# Patient Record
Sex: FEMALE | Race: OTHER
[De-identification: names, ages, dates, MRNs, and addresses within clinical notes are randomized per-mention and may not be internally consistent; named-entity substitution may affect disease eponyms.]

---

## 2019-09-20 ENCOUNTER — HOSPITAL ENCOUNTER (OUTPATIENT)
Dept: HOSPITAL 62 - RAD | Age: 29
End: 2019-09-20
Attending: PHYSICIAN ASSISTANT
Payer: OTHER GOVERNMENT
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
## 2019-09-20 DIAGNOSIS — S63.502D: Primary | ICD-10-CM

## 2019-09-20 PROCEDURE — A9576 INJ PROHANCE MULTIPACK: HCPCS

## 2019-09-20 PROCEDURE — 25246 INJECTION FOR WRIST X-RAY: CPT

## 2019-09-20 PROCEDURE — 77002 NEEDLE LOCALIZATION BY XRAY: CPT

## 2019-09-20 PROCEDURE — 73222 MRI JOINT UPR EXTREM W/DYE: CPT

## 2019-09-20 NOTE — RADIOLOGY REPORT (SQ)
EXAM DESCRIPTION:  MRI LT UPPER JOINT WITH



COMPLETED DATE/TIME:  9/20/2019 10:58 am



REASON FOR STUDY:  S63.502D UNSPECIFIED SPRAIN OF LEFT WRIST, SUBSEQUENT ENCOUNTER S63.502D  UNSPECIF
IED SPRAIN OF LEFT WRIST, SUBSEQUENT ENCOUN



COMPARISON:  None.



TECHNIQUE:  Left wrist post-arthrogram imaging includes T1 and T1 and T2 fat sat sequences.



LIMITATIONS:  None.



FINDINGS:  JOINT DISTENSION: Adequate.  No loose body.  There is extravasation of contrast dorsally a
long the injection pathway, with a small amount of injected contrast into the extensor tendon compart
ments on axial images 7-12.

BONE MARROW: No alteration of signal to suggest marrow replacement or edema. No occult fracture. No l
arge osteophytes.

CARPAL ALIGNMENT AND ARTICULATION: Normal congruity of sigmoid notch at level of distal ruj without p
ositive or negative ulnar variance. Normal capitolunate angle. No widening of scapholunate articulati
on.

SCAPHOLUNATE LIGAMENT: Although there is mild widening at the scapholunate interval on coronal image 
9, scapholunate ligaments are intact, there is no leakage of contrast from the radiocarpal joint into
 the intercarpal joints.

LUNATO-TRIQUETRAL LIGAMENT: Without tear. No contrast in middle carpal compartment.

TFC COMPLEX: Radial and ulnar attachments normal. Meniscus intact. Extensor carpi ulnaris tendon norm
al without tendinopathy. No contrast in distal RUJ.

EXTRINSIC LIGAMENTS AND DISTAL RADIO-ULNAR JOINT: Dorsal and volar distal RUJ ligaments intact withou
t subluxation of the distal ulna with respect to the radius.

1-6 EXTENSOR COMPARTMENTS: Normal. Specifically no tendinopathy of the abductor pollicis longus or ex
tensor pollicis brevis to suggest de Quervains syndrome.

CARPAL TUNNEL AND MEDIAN NERVE: Normal volume and morphology of carpal tunnel proximal at the level o
f the radiocarpal joint and distally at the hook of the hamate. No thickening or signal alteration of
 median nerve.

OTHER: No other significant finding.



IMPRESSION:  NORMAL MRI ARTHROGRAM OF THE WRIST.



TECHNICAL DOCUMENTATION:  JOB ID:  [id number]

 2011 [employer]- All Rights Reserved



Reading location - IP/workstation name: [facility]

## 2019-09-20 NOTE — RADIOLOGY REPORT (SQ)
EXAM DESCRIPTION:  FLUORO/NEEDLE PLACEMENT; ARTHRO WRIST INJECTION



COMPLETED DATE/TIME:  9/20/2019 10:28 am



REASON FOR STUDY:  S63.502D UNSPECIFIED SPRAIN OF LEFT WRIST, SUBSEQUENT ENCOUNTER S63.502D  UNSPECIF
IED SPRAIN OF LEFT WRIST, SUBSEQUENT ENCOUN



COMPARISON:  None.



FLUOROSCOPY TIME:  16 seconds

1 images saved to PACS.



LIMITATIONS:  None.



PROCEDURE:  Procedure, risks, benefits and alternatives explained to patient who then gave written co
nsent. The left wrist was marked and a time-out was called for correct marking verification.  Radioca
rpal site marked using fluoroscopic guidance.  Wrist prepped and draped using sterile technique.  Loc
al anesthesia achieved using 1% lidocaine injection.  Hypodermic needle introduced into the joint spa
ce under direct fluoroscopic visualization. Non-ionic contrast instilled to confirm intra-articular p
osition. Dilute gadolinium solution then injected.  Needle removed and entry site covered with steril
e bandage. No immediate complications noted.



TECHNIQUE:  Digital images acquired during fluoroscopy and stored on PACS.   Patient immediately take
n to the MR suite for additional imaging.

INJECTION LOCATION: Left wrist.

CONTRAST TYPE AND AMOUNT: Dotarem 3 mL



IMPRESSION:  SUCCESSFUL NEEDLE PLACEMENT AND INJECTION FOR LEFT WRIST MR ARTHROGRAM.



COMMENT:  Quality ID #145: Final reports for procedures using fluoroscopy that document radiation exp
osure indices, or exposure time and number of fluorographic images (if radiation exposure indices are
 not available)



TECHNICAL DOCUMENTATION:  JOB ID:  [id number]

 2011 [employer]- All Rights Reserved



Reading location - IP/workstation name: [email or web address]

## 2019-09-20 NOTE — RADIOLOGY REPORT (SQ)
EXAM DESCRIPTION:  FLUORO/NEEDLE PLACEMENT; ARTHRO WRIST INJECTION



COMPLETED DATE/TIME:  9/20/2019 10:28 am



REASON FOR STUDY:  S63.502D UNSPECIFIED SPRAIN OF LEFT WRIST, SUBSEQUENT ENCOUNTER S63.502D  UNSPECIF
IED SPRAIN OF LEFT WRIST, SUBSEQUENT ENCOUN



COMPARISON:  None.



FLUOROSCOPY TIME:  16 seconds

1 images saved to PACS.



LIMITATIONS:  None.



PROCEDURE:  Procedure, risks, benefits and alternatives explained to patient who then gave written co
nsent. The left wrist was marked and a time-out was called for correct marking verification.  Radioca
rpal site marked using fluoroscopic guidance.  Wrist prepped and draped using sterile technique.  Loc
al anesthesia achieved using 1% lidocaine injection.  Hypodermic needle introduced into the joint spa
ce under direct fluoroscopic visualization. Non-ionic contrast instilled to confirm intra-articular p
osition. Dilute gadolinium solution then injected.  Needle removed and entry site covered with steril
e bandage. No immediate complications noted.



TECHNIQUE:  Digital images acquired during fluoroscopy and stored on PACS.   Patient immediately take
n to the MR suite for additional imaging.

INJECTION LOCATION: Left wrist.

CONTRAST TYPE AND AMOUNT: Dotarem 3 mL



IMPRESSION:  SUCCESSFUL NEEDLE PLACEMENT AND INJECTION FOR LEFT WRIST MR ARTHROGRAM.



COMMENT:  Quality ID #145: Final reports for procedures using fluoroscopy that document radiation exp
osure indices, or exposure time and number of fluorographic images (if radiation exposure indices are
 not available)



TECHNICAL DOCUMENTATION:  JOB ID:  [id number]

 2011 [employer]- All Rights Reserved



Reading location - IP/workstation name: [email or web address]

## 2020-10-06 ENCOUNTER — HOSPITAL ENCOUNTER (EMERGENCY)
Dept: HOSPITAL 62 - ER | Age: 30
Discharge: LEFT BEFORE DISCHARGE | End: 2020-10-06
Payer: OTHER GOVERNMENT

## 2020-10-06 VITALS — SYSTOLIC BLOOD PRESSURE: 114 MMHG | DIASTOLIC BLOOD PRESSURE: 88 MMHG

## 2020-10-06 DIAGNOSIS — Z53.21: Primary | ICD-10-CM

## 2021-03-09 ENCOUNTER — APPOINTMENT (OUTPATIENT)
Dept: CT IMAGING | Facility: HOSPITAL | Age: 31
End: 2021-03-09

## 2021-03-09 ENCOUNTER — HOSPITAL ENCOUNTER (EMERGENCY)
Facility: HOSPITAL | Age: 31
Discharge: HOME | End: 2021-03-09
Attending: EMERGENCY MEDICINE | Admitting: EMERGENCY MEDICINE

## 2021-03-09 VITALS
HEART RATE: 71 BPM | RESPIRATION RATE: 16 BRPM | HEIGHT: 61 IN | BODY MASS INDEX: 27.38 KG/M2 | DIASTOLIC BLOOD PRESSURE: 65 MMHG | WEIGHT: 145 LBS | SYSTOLIC BLOOD PRESSURE: 108 MMHG | TEMPERATURE: 98.7 F | OXYGEN SATURATION: 97 %

## 2021-03-09 DIAGNOSIS — R11.0 NAUSEA: ICD-10-CM

## 2021-03-09 DIAGNOSIS — R10.31 RIGHT LOWER QUADRANT ABDOMINAL PAIN: Primary | ICD-10-CM

## 2021-03-09 LAB
ALBUMIN SERPL-MCNC: 4 G/DL (ref 3.5–5.2)
ALBUMIN/GLOB SERPL: 1.1 G/DL
ALP SERPL-CCNC: 97 U/L (ref 39–117)
ALT SERPL W P-5'-P-CCNC: 35 U/L (ref 1–33)
ANION GAP SERPL CALCULATED.3IONS-SCNC: 7.7 MMOL/L (ref 5–15)
AST SERPL-CCNC: 24 U/L (ref 1–32)
B-HCG UR QL: NEGATIVE
BACTERIA UR QL AUTO: ABNORMAL /HPF
BACTERIA UR QL AUTO: ABNORMAL /HPF
BASOPHILS # BLD AUTO: 0.05 10*3/MM3 (ref 0–0.2)
BASOPHILS NFR BLD AUTO: 0.5 % (ref 0–1.5)
BILIRUB SERPL-MCNC: 0.4 MG/DL (ref 0–1.2)
BILIRUB UR QL STRIP: NEGATIVE
BILIRUB UR QL STRIP: NEGATIVE
BUN SERPL-MCNC: 13 MG/DL (ref 6–20)
BUN/CREAT SERPL: 23.2 (ref 7–25)
CALCIUM SPEC-SCNC: 8.7 MG/DL (ref 8.6–10.5)
CHLORIDE SERPL-SCNC: 104 MMOL/L (ref 98–107)
CLARITY UR: CLEAR
CLARITY UR: CLEAR
CO2 SERPL-SCNC: 24.3 MMOL/L (ref 22–29)
COLOR UR: YELLOW
COLOR UR: YELLOW
CREAT SERPL-MCNC: 0.56 MG/DL (ref 0.57–1)
DEPRECATED RDW RBC AUTO: 42.3 FL (ref 37–54)
EOSINOPHIL # BLD AUTO: 0.12 10*3/MM3 (ref 0–0.4)
EOSINOPHIL NFR BLD AUTO: 1.2 % (ref 0.3–6.2)
ERYTHROCYTE [DISTWIDTH] IN BLOOD BY AUTOMATED COUNT: 12.3 % (ref 12.3–15.4)
GFR SERPL CREATININE-BSD FRML MDRD: 127 ML/MIN/1.73
GFR SERPL CREATININE-BSD FRML MDRD: >150 ML/MIN/1.73
GLOBULIN UR ELPH-MCNC: 3.5 GM/DL
GLUCOSE SERPL-MCNC: 93 MG/DL (ref 65–99)
GLUCOSE UR STRIP-MCNC: NEGATIVE MG/DL
GLUCOSE UR STRIP-MCNC: NEGATIVE MG/DL
HCT VFR BLD AUTO: 43.4 % (ref 34–46.6)
HGB BLD-MCNC: 14.7 G/DL (ref 12–15.9)
HGB UR QL STRIP.AUTO: ABNORMAL
HGB UR QL STRIP.AUTO: ABNORMAL
HYALINE CASTS UR QL AUTO: ABNORMAL /LPF
HYALINE CASTS UR QL AUTO: ABNORMAL /LPF
IMM GRANULOCYTES # BLD AUTO: 0.02 10*3/MM3 (ref 0–0.05)
IMM GRANULOCYTES NFR BLD AUTO: 0.2 % (ref 0–0.5)
KETONES UR QL STRIP: NEGATIVE
KETONES UR QL STRIP: NEGATIVE
LEUKOCYTE ESTERASE UR QL STRIP.AUTO: ABNORMAL
LEUKOCYTE ESTERASE UR QL STRIP.AUTO: NEGATIVE
LIPASE SERPL-CCNC: 18 U/L (ref 13–60)
LYMPHOCYTES # BLD AUTO: 2.93 10*3/MM3 (ref 0.7–3.1)
LYMPHOCYTES NFR BLD AUTO: 29 % (ref 19.6–45.3)
MCH RBC QN AUTO: 31.1 PG (ref 26.6–33)
MCHC RBC AUTO-ENTMCNC: 33.9 G/DL (ref 31.5–35.7)
MCV RBC AUTO: 91.8 FL (ref 79–97)
MONOCYTES # BLD AUTO: 0.51 10*3/MM3 (ref 0.1–0.9)
MONOCYTES NFR BLD AUTO: 5 % (ref 5–12)
NEUTROPHILS NFR BLD AUTO: 6.48 10*3/MM3 (ref 1.7–7)
NEUTROPHILS NFR BLD AUTO: 64.1 % (ref 42.7–76)
NITRITE UR QL STRIP: NEGATIVE
NITRITE UR QL STRIP: NEGATIVE
PH UR STRIP.AUTO: 5.5 [PH] (ref 4.5–8)
PH UR STRIP.AUTO: 7 [PH] (ref 4.5–8)
PLATELET # BLD AUTO: 218 10*3/MM3 (ref 140–450)
PMV BLD AUTO: 10.3 FL (ref 6–12)
POTASSIUM SERPL-SCNC: 3.7 MMOL/L (ref 3.5–5.2)
PROT SERPL-MCNC: 7.5 G/DL (ref 6–8.5)
PROT UR QL STRIP: NEGATIVE
PROT UR QL STRIP: NEGATIVE
RBC # BLD AUTO: 4.73 10*6/MM3 (ref 3.77–5.28)
RBC # UR: ABNORMAL /HPF
RBC # UR: ABNORMAL /HPF
REF LAB TEST METHOD: ABNORMAL
REF LAB TEST METHOD: ABNORMAL
SODIUM SERPL-SCNC: 136 MMOL/L (ref 136–145)
SP GR UR STRIP: 1.02 (ref 1–1.03)
SP GR UR STRIP: 1.02 (ref 1–1.03)
SQUAMOUS #/AREA URNS HPF: ABNORMAL /HPF
SQUAMOUS #/AREA URNS HPF: ABNORMAL /HPF
UROBILINOGEN UR QL STRIP: ABNORMAL
UROBILINOGEN UR QL STRIP: ABNORMAL
WBC # BLD AUTO: 10.11 10*3/MM3 (ref 3.4–10.8)
WBC UR QL AUTO: ABNORMAL /HPF
WBC UR QL AUTO: ABNORMAL /HPF

## 2021-03-09 PROCEDURE — 87088 URINE BACTERIA CULTURE: CPT | Performed by: EMERGENCY MEDICINE

## 2021-03-09 PROCEDURE — 81025 URINE PREGNANCY TEST: CPT | Performed by: EMERGENCY MEDICINE

## 2021-03-09 PROCEDURE — 0 IOPAMIDOL PER 1 ML: Performed by: EMERGENCY MEDICINE

## 2021-03-09 PROCEDURE — 80053 COMPREHEN METABOLIC PANEL: CPT | Performed by: PHYSICIAN ASSISTANT

## 2021-03-09 PROCEDURE — 87147 CULTURE TYPE IMMUNOLOGIC: CPT | Performed by: EMERGENCY MEDICINE

## 2021-03-09 PROCEDURE — 81001 URINALYSIS AUTO W/SCOPE: CPT | Performed by: EMERGENCY MEDICINE

## 2021-03-09 PROCEDURE — 99283 EMERGENCY DEPT VISIT LOW MDM: CPT

## 2021-03-09 PROCEDURE — 83690 ASSAY OF LIPASE: CPT | Performed by: PHYSICIAN ASSISTANT

## 2021-03-09 PROCEDURE — 85025 COMPLETE CBC W/AUTO DIFF WBC: CPT | Performed by: PHYSICIAN ASSISTANT

## 2021-03-09 PROCEDURE — 81001 URINALYSIS AUTO W/SCOPE: CPT | Performed by: PHYSICIAN ASSISTANT

## 2021-03-09 PROCEDURE — 87086 URINE CULTURE/COLONY COUNT: CPT | Performed by: EMERGENCY MEDICINE

## 2021-03-09 PROCEDURE — 74177 CT ABD & PELVIS W/CONTRAST: CPT

## 2021-03-09 RX ORDER — ONDANSETRON 4 MG/1
4 TABLET, ORALLY DISINTEGRATING ORAL EVERY 6 HOURS PRN
Qty: 10 TABLET | Refills: 0 | Status: SHIPPED | OUTPATIENT
Start: 2021-03-09

## 2021-03-09 RX ORDER — ONDANSETRON 2 MG/ML
4 INJECTION INTRAMUSCULAR; INTRAVENOUS ONCE
Status: DISCONTINUED | OUTPATIENT
Start: 2021-03-09 | End: 2021-03-09 | Stop reason: HOSPADM

## 2021-03-09 RX ORDER — SODIUM CHLORIDE 0.9 % (FLUSH) 0.9 %
10 SYRINGE (ML) INJECTION AS NEEDED
Status: DISCONTINUED | OUTPATIENT
Start: 2021-03-09 | End: 2021-03-09 | Stop reason: HOSPADM

## 2021-03-09 RX ADMIN — IOPAMIDOL 100 ML: 755 INJECTION, SOLUTION INTRAVENOUS at 14:37

## 2021-03-09 RX ADMIN — SODIUM CHLORIDE 1000 ML: 9 INJECTION, SOLUTION INTRAVENOUS at 13:34

## 2021-03-09 NOTE — ED PROVIDER NOTES
EMERGENCY DEPARTMENT ENCOUNTER      Room Number: 09/09    History is provided by the patient, no translation services needed    HPI:    Chief complaint: Abdominal pain    Location: Right lower    Quality/Severity: Cramping/8 out of 10    Timing/Duration: Today/intermittent    Modifying Factors: Nothing makes the pain better or worse    Associated Symptoms: Pain radiates to right flank area, nausea, vomiting    Narrative: Pt is a 31 y.o. female who presents complaining of right lower abdominal pain times today.  Patient states that she is had nausea and vomiting.  Patient denies any dysuria or frequent urination.  Patient denies any abnormal vaginal discharge.  Patient states that she is had similar episodes in the past.  Patient is that she has her had her gallbladder removed.  Patient states that she is using Nexplanon as of birth control and has not had her period.      PMD: Provider, No Known    REVIEW OF SYSTEMS  Review of Systems   Constitutional: Negative for chills and fever.   Eyes: Negative for pain and visual disturbance.   Respiratory: Negative for cough and shortness of breath.    Cardiovascular: Negative for chest pain and leg swelling.   Gastrointestinal: Positive for abdominal pain, nausea and vomiting. Negative for constipation and diarrhea.   Genitourinary: Positive for flank pain. Negative for dysuria.   Musculoskeletal: Negative for arthralgias and myalgias.   Skin: Negative for rash and wound.   Neurological: Negative for dizziness, syncope and headaches.   Psychiatric/Behavioral: Negative for suicidal ideas. The patient is not nervous/anxious.          PAST MEDICAL HISTORY  Active Ambulatory Problems     Diagnosis Date Noted   • No Active Ambulatory Problems     Resolved Ambulatory Problems     Diagnosis Date Noted   • No Resolved Ambulatory Problems     No Additional Past Medical History       PAST SURGICAL HISTORY  History reviewed. No pertinent surgical history.    FAMILY HISTORY  Family  History   Problem Relation Age of Onset   • No Known Problems Mother    • No Known Problems Father        SOCIAL HISTORY  Social History     Socioeconomic History   • Marital status:      Spouse name: Not on file   • Number of children: Not on file   • Years of education: Not on file   • Highest education level: Not on file   Tobacco Use   • Smoking status: Never Smoker   • Smokeless tobacco: Never Used   Substance and Sexual Activity   • Alcohol use: No   • Drug use: No   • Sexual activity: Defer       ALLERGIES  Toradol [ketorolac tromethamine]      Current Facility-Administered Medications:   •  ondansetron (ZOFRAN) injection 4 mg, 4 mg, Intravenous, Once, Misa Perez PA-C, Stopped at 03/09/21 1339  •  [COMPLETED] Insert peripheral IV, , , Once **AND** sodium chloride 0.9 % flush 10 mL, 10 mL, Intravenous, PRN, Misa Perez PA-C    Current Outpatient Medications:   •  acetaminophen (TYLENOL) 500 MG tablet, Take 500 mg by mouth Every 6 (Six) Hours As Needed for Mild Pain ., Disp: , Rfl:   •  ondansetron ODT (ZOFRAN-ODT) 4 MG disintegrating tablet, Place 1 tablet under the tongue Every 6 (Six) Hours As Needed for Nausea or Vomiting for up to 10 doses., Disp: 10 tablet, Rfl: 0    PHYSICAL EXAM  ED Triage Vitals [03/09/21 1202]   Temp Heart Rate Resp BP SpO2   98.7 °F (37.1 °C) 77 16 129/91 98 %      Temp src Heart Rate Source Patient Position BP Location FiO2 (%)   Oral Monitor Sitting Right arm --       Physical Exam  Vitals and nursing note reviewed.   HENT:      Head: Normocephalic and atraumatic.   Eyes:      Conjunctiva/sclera: Conjunctivae normal.   Cardiovascular:      Rate and Rhythm: Normal rate and regular rhythm.      Heart sounds: Normal heart sounds.   Pulmonary:      Effort: Pulmonary effort is normal. No respiratory distress.      Breath sounds: Normal breath sounds.   Abdominal:      General: Bowel sounds are normal. There is no distension.      Palpations: Abdomen is soft.       Tenderness: There is abdominal tenderness in the right upper quadrant. There is no right CVA tenderness or left CVA tenderness.   Musculoskeletal:         General: Normal range of motion.      Cervical back: Normal range of motion and neck supple.   Skin:     General: Skin is warm and dry.   Neurological:      Mental Status: She is alert and oriented to person, place, and time.   Psychiatric:         Mood and Affect: Mood and affect normal.           LAB RESULTS  Lab Results (last 24 hours)     Procedure Component Value Units Date/Time    Pregnancy, Urine - Urine, Clean Catch [[id number]]  (Normal) Collected: 03/09/21 1221    Specimen: Urine, Clean Catch Updated: 03/09/21 1241     HCG, Urine QL Negative    Urinalysis With Culture If Indicated - Urine, Clean Catch [[id number]]  (Abnormal) Collected: 03/09/21 1221    Specimen: Urine, Clean Catch Updated: 03/09/21 1240     Color, UA Yellow     Appearance, UA Clear     pH, UA 7.0     Specific Gravity, UA 1.025     Glucose, UA Negative     Ketones, UA Negative     Bilirubin, UA Negative     Blood, UA Trace     Protein, UA Negative     Leuk Esterase, UA Small (1+)     Nitrite, UA Negative     Urobilinogen, UA 0.2 E.U./dL    Urinalysis, Microscopic Only - Urine, Clean Catch [[id number]]  (Abnormal) Collected: 03/09/21 1221    Specimen: Urine, Clean Catch Updated: 03/09/21 1247     RBC, UA 3-5 /HPF      WBC, UA 31-50 /HPF      Bacteria, UA 3+ /HPF      Squamous Epithelial Cells, UA 13-20 /HPF      Hyaline Casts, UA None Seen /LPF      Methodology Manual Light Microscopy    Urine Culture - Urine, Urine, Clean Catch [[id number]] Collected: 03/09/21 1221    Specimen: Urine, Clean Catch Updated: 03/09/21 1247    CBC & Differential [[id number]]  (Normal) Collected: 03/09/21 1331    Specimen: Blood Updated: 03/09/21 1346    Narrative:      The following orders were created for panel order CBC & Differential.  Procedure                               Abnormality         Status                      ---------                               -----------         ------                     CBC Auto Differential[[id number]]        Normal              Final result                 Please view results for these tests on the individual orders.    Comprehensive Metabolic Panel [[id number]]  (Abnormal) Collected: 03/09/21 1331    Specimen: Blood Updated: 03/09/21 1416     Glucose 93 mg/dL      BUN 13 mg/dL      Creatinine 0.56 mg/dL      Sodium 136 mmol/L      Potassium 3.7 mmol/L      Chloride 104 mmol/L      CO2 24.3 mmol/L      Calcium 8.7 mg/dL      Total Protein 7.5 g/dL      Albumin 4.00 g/dL      ALT (SGPT) 35 U/L      AST (SGOT) 24 U/L      Alkaline Phosphatase 97 U/L      Total Bilirubin 0.4 mg/dL      eGFR Non African Amer 127 mL/min/1.73      eGFR  African Amer >150 mL/min/1.73      Globulin 3.5 gm/dL      A/G Ratio 1.1 g/dL      BUN/Creatinine Ratio 23.2     Anion Gap 7.7 mmol/L     Narrative:      GFR Normal >60  Chronic Kidney Disease <60  Kidney Failure <15      Lipase [[id number]]  (Normal) Collected: 03/09/21 1331    Specimen: Blood Updated: 03/09/21 1416     Lipase 18 U/L     CBC Auto Differential [[id number]]  (Normal) Collected: 03/09/21 1331    Specimen: Blood Updated: 03/09/21 1346     WBC 10.11 10*3/mm3      RBC 4.73 10*6/mm3      Hemoglobin 14.7 g/dL      Hematocrit 43.4 %      MCV 91.8 fL      MCH 31.1 pg      MCHC 33.9 g/dL      RDW 12.3 %      RDW-SD 42.3 fl      MPV 10.3 fL      Platelets 218 10*3/mm3      Neutrophil % 64.1 %      Lymphocyte % 29.0 %      Monocyte % 5.0 %      Eosinophil % 1.2 %      Basophil % 0.5 %      Immature Grans % 0.2 %      Neutrophils, Absolute 6.48 10*3/mm3      Lymphocytes, Absolute 2.93 10*3/mm3      Monocytes, Absolute 0.51 10*3/mm3      Eosinophils, Absolute 0.12 10*3/mm3      Basophils, Absolute 0.05 10*3/mm3      Immature Grans, Absolute 0.02 10*3/mm3     Urinalysis With Microscopic If Indicated (No Culture) - Urine, Clean Catch [[id number]]  (Abnormal)  Collected: 03/09/21 1518    Specimen: Urine, Clean Catch Updated: 03/09/21 1525     Color, UA Yellow     Appearance, UA Clear     pH, UA 5.5     Specific Gravity, UA 1.025     Glucose, UA Negative     Ketones, UA Negative     Bilirubin, UA Negative     Blood, UA Small (1+)     Protein, UA Negative     Leuk Esterase, UA Negative     Nitrite, UA Negative     Urobilinogen, UA 0.2 E.U./dL    Urinalysis, Microscopic Only - Urine, Clean Catch [[id number]]  (Abnormal) Collected: 03/09/21 1518    Specimen: Urine, Clean Catch Updated: 03/09/21 1527     RBC, UA 3-5 /HPF      WBC, UA 0-2 /HPF      Bacteria, UA 1+ /HPF      Squamous Epithelial Cells, UA 3-6 /HPF      Hyaline Casts, UA None Seen /LPF      Methodology Manual Light Microscopy            I ordered the above labs and reviewed the results    RADIOLOGY  CT Abdomen Pelvis With Contrast    Result Date: 3/9/2021  CT Abdomen Pelvis W INDICATION: Generalized abdomen pain since yesterday. Previous cholecystectomy TECHNIQUE: CT of the abdomen and pelvis with IV contrast. Coronal and sagittal reconstructions were obtained.  Radiation dose reduction techniques included automated exposure control or exposure modulation based on body size. Count of known CT and cardiac nuc med studies performed in previous 12 months: 0 COMPARISON: 9 FINDINGS: Abdomen: Lung bases are clear. Gallbladder is been removed. Liver, spleen, pancreas, adrenal glands and kidneys are normal in appearance. Aorta is normal in size. There is no adenopathy. The appendix is normal. The bowel is normal.. Pelvis: Uterus bladder and adnexal regions are normal. Bones are unremarkable.     Prior cholecystectomy Normal appendix Otherwise normal Signer Name: Yoav Phillips MD  Signed: 3/9/2021 2:52 PM  Workstation Name: [email or web address]  [facility]      I ordered the above radiologic testing and reviewed the results    PROCEDURES  Procedures      PROGRESS AND CONSULTS  ED Course as of Mar 09 1541   Tue  Mar 09, 2021   1532 Squamous Epithelial Cells, UA(!): 3-6 [GT]   1533 Bacteria, UA(!): 1+ [GT]   1533 WBC, UA(!): 0-2 [GT]   1540 31-year-old female presents to the ED with complaints of abdominal pain and nausea that began this morning.  Patient that she has episodes of this in the past.  Patient denied any diarrhea.  Patient denied any fever.  Patient denied any dysuria.  After history and physical exam patient's laboratory studies showed that she had a normal white count at 10.11.  Patient's electrolytes were unremarkable.  Patient's urinalysis showed that she had 0-2 WBCs with 1+ bacteria but also showed 3-6 squamous cells which I thought were most likely from a dirty sample.  Since the patient did not have any dysuria discussed with her that I did not feel that this was a urinary tract infection.  Patient CT showed no acute disease process.  Discussed with patient that she would need to follow-up with her primary care provider for further evaluation to her abdominal pain.  Discussed with patient that if her symptoms worsen that she should return to the ED.  Patient expressed verbal understanding of plan of care.    [GT]      ED Course User Index  [GT] Misa Perez, LAURENT           MEDICAL DECISION MAKING    MDM       DIAGNOSIS  Final diagnoses:   Right lower quadrant abdominal pain   Nausea       Latest Documented Vital Signs:  As of 15:41 EST  BP- 108/65 HR- 71 Temp- 98.7 °F (37.1 °C) (Oral) O2 sat- 97%    DISPOSITION  Discharged home      Discussed pertinent labs and imaging findings with the patient/family.  Patient/Family voiced understanding of need to follow-up for recheck, further testing as needed.  Return to the emergency Department warnings were given.         Medication List      New Prescriptions    ondansetron ODT 4 MG disintegrating tablet  Commonly known as: ZOFRAN-ODT  Place 1 tablet under the tongue Every 6 (Six) Hours As Needed for Nausea or Vomiting for up to 10 doses.           Where to  Get Your Medications      These medications were sent to [id number] - [place], [place] - [address] - [phone]  - [phone]  [address], [facility] [address]    Phone: [phone]   · ondansetron ODT 4 MG disintegrating tablet             Follow-up Information     Provider, No Known. Call in 1 day.    Why: To schedule a follow up appointment  Contact information:  [facility] [id number]                     Dictated utilizing Dragon dictation     Misa Perez PA-C  03/09/21 0728

## 2021-03-11 LAB — BACTERIA SPEC AEROBE CULT: ABNORMAL

## 2021-03-30 ENCOUNTER — OFFICE VISIT (OUTPATIENT)
Dept: OBSTETRICS AND GYNECOLOGY | Facility: CLINIC | Age: 31
End: 2021-03-30

## 2021-03-30 VITALS
DIASTOLIC BLOOD PRESSURE: 80 MMHG | SYSTOLIC BLOOD PRESSURE: 118 MMHG | WEIGHT: 156.6 LBS | BODY MASS INDEX: 29.57 KG/M2 | HEIGHT: 61 IN

## 2021-03-30 DIAGNOSIS — R10.30 LOWER ABDOMINAL PAIN: ICD-10-CM

## 2021-03-30 DIAGNOSIS — N75.0 BARTHOLIN'S CYST: ICD-10-CM

## 2021-03-30 DIAGNOSIS — Z01.419 WELL WOMAN EXAM: ICD-10-CM

## 2021-03-30 DIAGNOSIS — Z01.419 ROUTINE GYNECOLOGICAL EXAMINATION: ICD-10-CM

## 2021-03-30 DIAGNOSIS — Z97.5 NEXPLANON IN PLACE: ICD-10-CM

## 2021-03-30 DIAGNOSIS — Z90.49 HX LAPAROSCOPIC CHOLECYSTECTOMY: ICD-10-CM

## 2021-03-30 DIAGNOSIS — Z87.42 H/O ABNORMAL CERVICAL PAPANICOLAOU SMEAR: ICD-10-CM

## 2021-03-30 DIAGNOSIS — Z01.419 PAP SMEAR, LOW-RISK: Primary | ICD-10-CM

## 2021-03-30 LAB
B-HCG UR QL: NEGATIVE
BILIRUB BLD-MCNC: NEGATIVE MG/DL
CLARITY, POC: CLEAR
COLOR UR: YELLOW
GLUCOSE UR STRIP-MCNC: NEGATIVE MG/DL
INTERNAL NEGATIVE CONTROL: NEGATIVE
INTERNAL POSITIVE CONTROL: POSITIVE
KETONES UR QL: NEGATIVE
LEUKOCYTE EST, POC: NEGATIVE
Lab: 55
NITRITE UR-MCNC: NEGATIVE MG/ML
PH UR: 5 [PH] (ref 5–8)
PROT UR STRIP-MCNC: NEGATIVE MG/DL
RBC # UR STRIP: ABNORMAL /UL
SP GR UR: 1.02 (ref 1–1.03)
UROBILINOGEN UR QL: NORMAL

## 2021-03-30 PROCEDURE — 99214 OFFICE O/P EST MOD 30 MIN: CPT | Performed by: OBSTETRICS & GYNECOLOGY

## 2021-03-30 PROCEDURE — 99385 PREV VISIT NEW AGE 18-39: CPT | Performed by: OBSTETRICS & GYNECOLOGY

## 2021-03-30 PROCEDURE — 81025 URINE PREGNANCY TEST: CPT | Performed by: OBSTETRICS & GYNECOLOGY

## 2021-03-30 PROCEDURE — 81002 URINALYSIS NONAUTO W/O SCOPE: CPT | Performed by: OBSTETRICS & GYNECOLOGY

## 2021-03-30 RX ORDER — HYOSCYAMINE SULFATE EXTENDED-RELEASE 0.38 MG/1
0.38 TABLET ORAL
COMMUNITY
Start: 2021-03-12 | End: 2022-03-12

## 2021-04-01 LAB
CYTOLOGIST CVX/VAG CYTO: NORMAL
CYTOLOGY CVX/VAG DOC CYTO: NORMAL
CYTOLOGY CVX/VAG DOC THIN PREP: NORMAL
DX ICD CODE: NORMAL
HIV 1 & 2 AB SER-IMP: NORMAL
HPV I/H RISK 4 DNA CVX QL PROBE+SIG AMP: NEGATIVE
OTHER STN SPEC: NORMAL
STAT OF ADQ CVX/VAG CYTO-IMP: NORMAL

## 2021-04-05 ENCOUNTER — TELEPHONE (OUTPATIENT)
Dept: OBSTETRICS AND GYNECOLOGY | Facility: CLINIC | Age: 31
End: 2021-04-05

## 2021-04-05 NOTE — TELEPHONE ENCOUNTER
Pt called stating that someone called her about a script being called in, but when she went to pharmacy there wasn't one there.  I see in notes that you were going to send her antibiotics, so can you please send them?  Thanks.

## 2021-04-07 ENCOUNTER — TELEPHONE (OUTPATIENT)
Dept: OBSTETRICS AND GYNECOLOGY | Facility: CLINIC | Age: 31
End: 2021-04-07

## 2021-04-07 NOTE — TELEPHONE ENCOUNTER
ADD:  Pt incorrectly informed that she had trichomonas.  Pt was called and informed of same.  No treatment necessary.

## 2021-04-14 ENCOUNTER — OFFICE VISIT (OUTPATIENT)
Dept: OBSTETRICS AND GYNECOLOGY | Facility: CLINIC | Age: 31
End: 2021-04-14

## 2021-04-14 VITALS
DIASTOLIC BLOOD PRESSURE: 84 MMHG | BODY MASS INDEX: 29.79 KG/M2 | WEIGHT: 157.8 LBS | SYSTOLIC BLOOD PRESSURE: 122 MMHG | HEIGHT: 61 IN

## 2021-04-14 DIAGNOSIS — Z13.9 SCREENING FOR CONDITION: Primary | ICD-10-CM

## 2021-04-14 DIAGNOSIS — Z30.46 ENCOUNTER FOR REMOVAL AND REINSERTION OF NEXPLANON: ICD-10-CM

## 2021-04-14 LAB
B-HCG UR QL: NEGATIVE
BILIRUB BLD-MCNC: NEGATIVE MG/DL
CLARITY, POC: CLEAR
COLOR UR: YELLOW
GLUCOSE UR STRIP-MCNC: NEGATIVE MG/DL
INTERNAL NEGATIVE CONTROL: NEGATIVE
INTERNAL POSITIVE CONTROL: POSITIVE
KETONES UR QL: NEGATIVE
LEUKOCYTE EST, POC: NEGATIVE
Lab: 55
NITRITE UR-MCNC: NEGATIVE MG/ML
PH UR: 5 [PH] (ref 5–8)
PROT UR STRIP-MCNC: NEGATIVE MG/DL
RBC # UR STRIP: NEGATIVE /UL
SP GR UR: 1 (ref 1–1.03)
UROBILINOGEN UR QL: NORMAL

## 2021-04-14 PROCEDURE — 11983 REMOVE/INSERT DRUG IMPLANT: CPT | Performed by: OBSTETRICS & GYNECOLOGY

## 2021-04-14 PROCEDURE — 81002 URINALYSIS NONAUTO W/O SCOPE: CPT | Performed by: OBSTETRICS & GYNECOLOGY

## 2021-04-14 PROCEDURE — 81025 URINE PREGNANCY TEST: CPT | Performed by: OBSTETRICS & GYNECOLOGY

## 2021-04-14 NOTE — PROGRESS NOTES
Procedure: Nexplanon removal and replacement    Chief Complaint: Nexplanon removal    Procedures      Pre Dx: 1) Nexplanon removal and replacement  Post Dx: 1) Nexplanon removal     The risks, benefits, and alternatives to remove the device have been discussed with the patient at length. All of her questions are answered. She is aware of the need for alternative means of contraception if desired. Verbal informed consent is obtained.    Able to easily palpate the device in the left arm. Additional imaging was not required. The device feels freely mobile and easily accessible. She was placed in the  supine position with her arm elevated at her side. The skin over this site is prepped with Betadine. 2-3 mL of 1% lidocaine with no epinephrine was injected.  Downward pressure was applied on the end of the implant nearest the axilla, and a 2-3 mm incision was made in a longitudinal direction of the arm at the tip of the implant closest to the elbow. The implant was then pushed gently toward the incision until the tip was visible. The fibrous capsule was opened with blunt dissection using a hemostat. The implant was grasp with a hemostat and was easily removed intact. It measured a  full 4 cm in length.    A replacement nexplanon system was placed after infiltrating more lidocaine past the incision.     Tolerated well  No apparent complications    The skin was cleansed and dried. A Steri-Strip was applied. The arm was gently wrapped with Kerlex gauze. The patient had normal strength and sensation in her upper extremity. There was no significant bleeding. There were no complications. The patient was advised about proper care of the dressings. She was advised to call or return for complications such as fever, signs of infection, increased pain, or any other concerns.    Warning signs, limitations and expectations reviewed.     Diagnoses and all orders for this visit:    1. Screening for condition (Primary)  -     POC  Urinalysis Dipstick  -     POC Pregnancy, Urine    2. Encounter for removal and reinsertion of Nexplanon        RTO No follow-ups on file.    Ronald Mccrary MD    4/14/2021  15:16 EDT      Add:  I explained to pt that her last pap was wnl and that she at this point, doesn't need a hysterectomy.   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    Still awaiting records from her last pap smear.

## 2021-04-28 PROBLEM — R87.612 LGSIL ON PAP SMEAR OF CERVIX: Status: ACTIVE | Noted: 2021-03-30

## 2022-03-05 ENCOUNTER — HOSPITAL ENCOUNTER (EMERGENCY)
Facility: HOSPITAL | Age: 32
Discharge: HOME | End: 2022-03-05
Attending: EMERGENCY MEDICINE | Admitting: EMERGENCY MEDICINE

## 2022-03-05 ENCOUNTER — APPOINTMENT (OUTPATIENT)
Dept: CT IMAGING | Facility: HOSPITAL | Age: 32
End: 2022-03-05

## 2022-03-05 VITALS
DIASTOLIC BLOOD PRESSURE: 69 MMHG | SYSTOLIC BLOOD PRESSURE: 116 MMHG | BODY MASS INDEX: 30.96 KG/M2 | WEIGHT: 164 LBS | RESPIRATION RATE: 16 BRPM | HEART RATE: 77 BPM | HEIGHT: 61 IN | OXYGEN SATURATION: 98 % | TEMPERATURE: 98.8 F

## 2022-03-05 DIAGNOSIS — J45.20 MILD INTERMITTENT REACTIVE AIRWAY DISEASE WITHOUT COMPLICATION: Primary | ICD-10-CM

## 2022-03-05 LAB — TROPONIN T SERPL-MCNC: <0.01 NG/ML (ref 0–0.03)

## 2022-03-05 PROCEDURE — 99283 EMERGENCY DEPT VISIT LOW MDM: CPT

## 2022-03-05 PROCEDURE — 99283 EMERGENCY DEPT VISIT LOW MDM: CPT | Performed by: EMERGENCY MEDICINE

## 2022-03-05 PROCEDURE — 93010 ELECTROCARDIOGRAM REPORT: CPT | Performed by: INTERNAL MEDICINE

## 2022-03-05 PROCEDURE — 0 IOPAMIDOL PER 1 ML: Performed by: EMERGENCY MEDICINE

## 2022-03-05 PROCEDURE — 93005 ELECTROCARDIOGRAM TRACING: CPT | Performed by: EMERGENCY MEDICINE

## 2022-03-05 PROCEDURE — 84484 ASSAY OF TROPONIN QUANT: CPT | Performed by: EMERGENCY MEDICINE

## 2022-03-05 PROCEDURE — 71275 CT ANGIOGRAPHY CHEST: CPT

## 2022-03-05 RX ORDER — SODIUM CHLORIDE 0.9 % (FLUSH) 0.9 %
10 SYRINGE (ML) INJECTION AS NEEDED
Status: DISCONTINUED | OUTPATIENT
Start: 2022-03-05 | End: 2022-03-05 | Stop reason: HOSPADM

## 2022-03-05 RX ORDER — ALBUTEROL SULFATE 90 UG/1
2 AEROSOL, METERED RESPIRATORY (INHALATION) EVERY 4 HOURS PRN
Qty: 6.7 G | Refills: 0 | Status: SHIPPED | OUTPATIENT
Start: 2022-03-05 | End: 2022-04-04

## 2022-03-05 RX ORDER — INHALER, ASSIST DEVICES
SPACER (EA) MISCELLANEOUS
Qty: 1 EACH | Refills: 0 | Status: SHIPPED | OUTPATIENT
Start: 2022-03-05

## 2022-03-05 RX ADMIN — IOPAMIDOL 100 ML: 755 INJECTION, SOLUTION INTRAVENOUS at 11:00

## 2022-03-05 NOTE — ED PROVIDER NOTES
"Subjective   Shivani Hewitt is a 32-year-old white female who presents secondary to shortness of breath and chest discomfort.  Onset of symptoms November 2021.  Patient notes when she bends at her waist she experiences chest discomfort and shortness of breathing.  This is also noted when she is in a supine position and flexes her head and neck.  The symptoms resolve with time.  Patient was seen 2 days ago at her PCP for the same symptoms.  Labs were obtained.  Patient's D-dimer was elevated.  Thus it was recommended patient be seen in the ED secondary to this abnormality.  Patient presents for evaluation      History provided by:  Patient      Review of Systems   Constitutional: Negative.  Negative for fever.   HENT: Negative.  Negative for rhinorrhea.    Eyes: Negative.  Negative for redness.   Respiratory: Positive for shortness of breath. Negative for cough.    Cardiovascular: Positive for chest pain.   Gastrointestinal: Negative for abdominal pain.   Endocrine: Negative.    Genitourinary: Negative.  Negative for difficulty urinating.   Musculoskeletal: Negative.  Negative for back pain.   Skin: Negative.  Negative for color change.   Neurological: Negative.  Negative for syncope.   Hematological: Negative.    Psychiatric/Behavioral: Negative.    All other systems reviewed and are negative.      History reviewed. No pertinent past medical history.    Allergies   Allergen Reactions   • Toradol [Ketorolac Tromethamine] Other (See Comments)     \"made pain worse\" per pt       History reviewed. No pertinent surgical history.    Family History   Problem Relation Age of Onset   • No Known Problems Mother    • No Known Problems Father        Social History     Socioeconomic History   • Marital status:    Tobacco Use   • Smoking status: Never Smoker   • Smokeless tobacco: Never Used   Substance and Sexual Activity   • Alcohol use: No   • Drug use: No   • Sexual activity: Not Currently     Birth control/protection: " Implant     Comment: nexplanon           Objective   Physical Exam  Vitals and nursing note reviewed.   Constitutional:       General: She is not in acute distress.     Appearance: Normal appearance. She is well-developed and normal weight. She is not ill-appearing, toxic-appearing or diaphoretic.      Comments: 32-year-old white female in bed.  Patient appears in excellent overall health.  Vital signs unremarkable.  Patient friendly and cooperative.   HENT:      Head: Normocephalic and atraumatic.      Right Ear: Tympanic membrane, ear canal and external ear normal.      Left Ear: Tympanic membrane, ear canal and external ear normal.      Nose: Nose normal.      Mouth/Throat:      Mouth: Mucous membranes are moist.      Pharynx: Oropharynx is clear.   Eyes:      Extraocular Movements: Extraocular movements intact.      Conjunctiva/sclera: Conjunctivae normal.      Pupils: Pupils are equal, round, and reactive to light.   Cardiovascular:      Rate and Rhythm: Normal rate and regular rhythm.      Pulses: Normal pulses.      Heart sounds: Normal heart sounds. No murmur heard.    No friction rub. No gallop.   Pulmonary:      Effort: Pulmonary effort is normal.      Breath sounds: Normal breath sounds.   Abdominal:      General: Abdomen is flat. Bowel sounds are normal. There is no distension.      Palpations: Abdomen is soft. There is no mass.      Tenderness: There is no abdominal tenderness. There is no guarding or rebound.      Hernia: No hernia is present.   Musculoskeletal:         General: Normal range of motion.      Cervical back: Normal range of motion and neck supple.   Skin:     General: Skin is warm and dry.      Capillary Refill: Capillary refill takes less than 2 seconds.   Neurological:      General: No focal deficit present.      Mental Status: She is alert and oriented to person, place, and time.      Deep Tendon Reflexes: Reflexes are normal and symmetric.   Psychiatric:         Mood and Affect: Mood  normal.         Behavior: Behavior normal.         Procedures         EKG 12-lead  Date 3/5/2022  Time 10: 47  Normal sinus rhythm with PAC  Normal rate  Leftward axis  Normal R wave transition  Normal intervals  No ST elevations or depressions  Nonspecific T wave changes  Essentially normal EKG      ED Course  ED Course as of 03/05/22 1641   Sat Mar 05, 2022   1049 Patient had full set of labs done at [facility] on 3/3/2022.  D-dimer elevated at 1.92.  Patient had  [SS]   1144 EKG only notable for a single PAC.  Otherwise unremarkable.  I reviewed patient's labs from 3/3/2022.  Those were unremarkable.  Troponin is negative.  CT chest shows only mild hyperinflation consistent with asthma or reactive airway disease.  No pulmonary embolism. [SS]   1157 Patient's chest discomfort and shortness of breath are associated with her bending over. She also experiences them when she flexes her neck to rise from a supine position. I recommend she follow-up with cardiology and pulmonology as I do not find any evidence of significant disease process. Will prescribe a bronchodilator for home. Discussed at length with patient all results, diagnoses, treatment follow-up. Will DC home.    Prescription   1-albuterol MDI  2-AeroChamber [SS]      ED Course User Index  [SS] Mamadou Bragg MD      Labs Reviewed   TROPONIN (IN-HOUSE) - Normal    Narrative:     Troponin T Reference Range:  <= 0.03 ng/mL-   Negative for AMI  >0.03 ng/mL-     Abnormal for myocardial necrosis.  Clinicians would have to utilize clinical acumen, EKG, Troponin and serial changes to determine if it is an Acute Myocardial Infarction or myocardial injury due to an underlying chronic condition.       Results may be falsely decreased if patient taking Biotin.       CT Angiogram Chest    Result Date: 3/5/2022  Narrative: CTA Chest INDICATION: Chest pain and shortness of air off and on since November 2021. Elevated d-dimer. TECHNIQUE: CT angiogram of the chest with with 100  cc Omnipaque 350 IV contrast. 3-D reconstructions were obtained and reviewed.   Radiation dose reduction techniques included automated exposure control or exposure modulation based on body size. Count of known CT and cardiac nuc med studies performed in previous 12 months: 0. COMPARISON: None available. FINDINGS: Mild pulmonary hyperinflation is nonspecific but may be seen with reactive airway disease. No active pneumonitis. No suspicious nodules or masses. No effusions. Minimal dependent atelectasis both lower lungs. Trachea and bronchi are unremarkable. Normal enhancement of the pulmonary arteries. No evidence of embolus. Heart size within normal limits. Aorta unremarkable. No central mass or adenopathy. Postcholecystectomy. Osseous structures and extrathoracic soft tissues are unremarkable.     Impression: No evidence of pulmonary embolus. Mild pulmonary hyperinflation is nonspecific but may be seen with asthma or reactive airway disease. No active pneumonitis. Signer Name: CATHERINE Singh MD  Signed: 3/5/2022 11:20 AM  Workstation Name: [facility]  [facility]      My differential diagnosis for dyspnea includes but is not limited to:  Asthma, COPD, COVID-19, pneumonia, pulmonary embolus, acute respiratory distress syndrome, RSV, pneumothorax, pleural effusion, pulmonary fibrosis, congestive heart failure, myocardial infarction, DKA, uremia, acidosis, sepsis, anemia, drug related, hyperventilation, CNS disease    My differential diagnosis for chest pain includes but is not limited to:  Muscle strain, costochondritis, myositis, pleurisy, rib fracture, intercostal neuritis, herpes zoster, tumor, myocardial infarction, coronary syndrome, unstable angina, angina, aortic dissection, mitral valve prolapse, pericarditis, palpitations, pulmonary embolus, pneumonia, pneumothorax, lung cancer, GERD, esophagitis, esophageal spasm                                             MDM    Final diagnoses:    Mild intermittent reactive airway disease without complication       ED Disposition  ED Disposition     ED Disposition   Discharge    Condition   Stable    Comment   --             Lauren Barth MD  [address] [facility] [address]  [place] [id number] [phone]    Schedule an appointment as soon as possible for a visit in 1 week  for continued or worsened symptoms, Sooner if needed    Drew Son MD  [address] [facility] [address]  [place] [id number] [phone]    Schedule an appointment as soon as possible for a visit in 1 week  for continued or worsened symptoms, Sooner if needed         Medication List      New Prescriptions    AeroChamber MV inhaler  Use as instructed     albuterol sulfate  (90 Base) MCG/ACT inhaler  Commonly known as: PROVENTIL HFA;VENTOLIN HFA;PROAIR HFA  Inhale 2 puffs Every 4 (Four) Hours As Needed for Shortness of Air for up to 30 days.           Where to Get Your Medications      These medications were sent to [facility] #[id number] - [place], [place]  [facility] [facility] AT SEC OF [place] [address] & [facility] - [phone] [facility] [phone] [address], [facility] [address]    Phone: [phone]   · AeroChamber MV inhaler  · albuterol sulfate  (90 Base) MCG/ACT inhaler          Mamadou Bragg MD  03/05/22 9136

## 2022-03-05 NOTE — DISCHARGE INSTRUCTIONS
No evidence of pulmonary embolism normal on CT. Does however suggest reactive airway disease or asthma. Thus I am prescribing albuterol inhaler to use as needed for shortness of breath. Follow-up with your PCP as needed. Follow-up with cardiology and pulmonology as above. Return to ED for worsening symptoms, medical emergencies.

## 2022-03-06 LAB — QT INTERVAL: 408 MS

## 2023-08-02 ENCOUNTER — OFFICE VISIT (OUTPATIENT)
Dept: OBSTETRICS AND GYNECOLOGY | Facility: CLINIC | Age: 33
End: 2023-08-02
Payer: OTHER GOVERNMENT

## 2023-08-02 VITALS
HEIGHT: 61 IN | WEIGHT: 157.8 LBS | BODY MASS INDEX: 29.79 KG/M2 | DIASTOLIC BLOOD PRESSURE: 84 MMHG | SYSTOLIC BLOOD PRESSURE: 120 MMHG

## 2023-08-02 DIAGNOSIS — N94.6 DYSMENORRHEA: ICD-10-CM

## 2023-08-02 DIAGNOSIS — R87.612 LGSIL ON PAP SMEAR OF CERVIX: ICD-10-CM

## 2023-08-02 DIAGNOSIS — Z13.89 SCREENING FOR GENITOURINARY CONDITION: Primary | ICD-10-CM

## 2023-08-02 DIAGNOSIS — Z90.49 HX LAPAROSCOPIC CHOLECYSTECTOMY: ICD-10-CM

## 2023-08-02 PROBLEM — Z97.5 NEXPLANON IN PLACE: Status: ACTIVE | Noted: 2023-08-02

## 2023-08-02 PROBLEM — Z30.46 ENCOUNTER FOR REMOVAL AND REINSERTION OF NEXPLANON: Status: RESOLVED | Noted: 2021-04-14 | Resolved: 2023-08-02

## 2023-08-02 LAB
BILIRUB BLD-MCNC: NEGATIVE MG/DL
CLARITY, POC: CLEAR
COLOR UR: YELLOW
GLUCOSE UR STRIP-MCNC: NEGATIVE MG/DL
KETONES UR QL: NEGATIVE
LEUKOCYTE EST, POC: NEGATIVE
NITRITE UR-MCNC: NEGATIVE MG/ML
PH UR: 5 [PH] (ref 5–8)
PROT UR STRIP-MCNC: NEGATIVE MG/DL
RBC # UR STRIP: NEGATIVE /UL
SP GR UR: 1 (ref 1–1.03)
UROBILINOGEN UR QL: NORMAL

## 2023-08-02 RX ORDER — IBUPROFEN 800 MG/1
800 TABLET ORAL EVERY 8 HOURS PRN
Qty: 30 TABLET | Refills: 0 | Status: SHIPPED | OUTPATIENT
Start: 2023-08-02 | End: 2023-08-02 | Stop reason: SDUPTHER

## 2023-08-02 RX ORDER — CEFUROXIME AXETIL 500 MG/1
TABLET ORAL
COMMUNITY
Start: 2023-04-17

## 2023-08-02 RX ORDER — IBUPROFEN 800 MG/1
800 TABLET ORAL EVERY 8 HOURS PRN
Qty: 30 TABLET | Refills: 0 | Status: SHIPPED | OUTPATIENT
Start: 2023-08-02

## 2023-08-02 RX ORDER — FLUTICASONE PROPIONATE AND SALMETEROL XINAFOATE 115; 21 UG/1; UG/1
2 AEROSOL, METERED RESPIRATORY (INHALATION)
COMMUNITY
Start: 2023-04-19 | End: 2024-04-18

## 2023-09-05 ENCOUNTER — OFFICE VISIT (OUTPATIENT)
Dept: OBSTETRICS AND GYNECOLOGY | Facility: CLINIC | Age: 33
End: 2023-09-05
Payer: OTHER GOVERNMENT

## 2023-09-05 VITALS
SYSTOLIC BLOOD PRESSURE: 114 MMHG | HEIGHT: 61 IN | WEIGHT: 156 LBS | DIASTOLIC BLOOD PRESSURE: 72 MMHG | BODY MASS INDEX: 29.45 KG/M2

## 2023-09-05 DIAGNOSIS — G43.111 MIGRAINE WITH AURA, WITH INTRACTABLE MIGRAINE, SO STATED, WITH STATUS MIGRAINOSUS: ICD-10-CM

## 2023-09-05 DIAGNOSIS — R87.612 LGSIL ON PAP SMEAR OF CERVIX: ICD-10-CM

## 2023-09-05 DIAGNOSIS — Z11.51 SPECIAL SCREENING EXAMINATION FOR HUMAN PAPILLOMAVIRUS (HPV): ICD-10-CM

## 2023-09-05 DIAGNOSIS — Z01.419 PAP SMEAR, LOW-RISK: Primary | ICD-10-CM

## 2023-09-05 DIAGNOSIS — Z01.419 ROUTINE GYNECOLOGICAL EXAMINATION: ICD-10-CM

## 2023-09-05 DIAGNOSIS — N75.0 BARTHOLIN'S CYST: ICD-10-CM

## 2023-09-05 PROBLEM — R10.30 LOWER ABDOMINAL PAIN: Status: RESOLVED | Noted: 2021-03-30 | Resolved: 2023-09-05

## 2023-09-05 PROBLEM — Z97.5 NEXPLANON IN PLACE: Status: RESOLVED | Noted: 2023-08-02 | Resolved: 2023-09-05

## 2023-09-05 LAB
B-HCG UR QL: NEGATIVE
BILIRUB BLD-MCNC: NEGATIVE MG/DL
CLARITY, POC: CLEAR
COLOR UR: YELLOW
EXPIRATION DATE: NORMAL
GLUCOSE UR STRIP-MCNC: NEGATIVE MG/DL
INTERNAL NEGATIVE CONTROL: NORMAL
INTERNAL POSITIVE CONTROL: NORMAL
KETONES UR QL: NEGATIVE
LEUKOCYTE EST, POC: NEGATIVE
Lab: NORMAL
NITRITE UR-MCNC: NEGATIVE MG/ML
PH UR: 5 [PH] (ref 5–8)
PROT UR STRIP-MCNC: NEGATIVE MG/DL
RBC # UR STRIP: NEGATIVE /UL
SP GR UR: 1.02 (ref 1–1.03)
UROBILINOGEN UR QL: NORMAL

## 2023-09-05 NOTE — PROGRESS NOTES
GYN Annual Exam     CC- Here for annual exam   Chief Complaint   Patient presents with    Gynecologic Exam     U/S          Shivani Hewitt is a 33 y.o.  female who presents for annual well woman exam. Patient's last menstrual period was 2023.    Problems in addition to need for annual: pt has recurrent menorrhagia with dysmenorrhea off the nexplanon.  Pt doesn't necessarily want meds for this, but still wants to maintain fertility. Discussed for 30 mins all her options.     HPI: Gynecologic Exam  The patient's primary symptoms include pelvic pain.     PMHX:    * No active hospital problems. *  ; otherwise none    OB History          2    Para   2    Term   2            AB        Living   2         SAB        IAB        Ectopic        Molar        Multiple        Live Births   2                  History reviewed. No pertinent past medical history.    Past Surgical History:   Procedure Laterality Date    CHOLECYSTECTOMY           Current Outpatient Medications:     sertraline (ZOLOFT) 50 MG tablet, , Disp: , Rfl:     acetaminophen (TYLENOL) 500 MG tablet, Take 500 mg by mouth Every 6 (Six) Hours As Needed for Mild Pain . (Patient not taking: Reported on 2023), Disp: , Rfl:     Advair -21 MCG/ACT inhaler, Inhale 2 puffs. (Patient not taking: Reported on 2023), Disp: , Rfl:     albuterol sulfate  (90 Base) MCG/ACT inhaler, Inhale 2 puffs Every 4 (Four) Hours As Needed. (Patient not taking: Reported on 2023), Disp: , Rfl:     cefuroxime (CEFTIN) 500 MG tablet, , Disp: , Rfl:     Drospirenone 4 MG tablet, Take 1 tablet by mouth Daily., Disp: 84 tablet, Rfl: 3    Fluticasone-Salmeterol (ADVAIR) 250-50 MCG/ACT DISKUS, Inhale 1 puff. (Patient not taking: Reported on 2023), Disp: , Rfl:     ibuprofen (ADVIL,MOTRIN) 800 MG tablet, Take 1 tablet by mouth Every 8 (Eight) Hours As Needed for Moderate Pain (pain). (Patient not taking: Reported on 2023), Disp: 30 tablet,  "Rfl: 0    ondansetron ODT (ZOFRAN-ODT) 4 MG disintegrating tablet, Place 1 tablet under the tongue Every 6 (Six) Hours As Needed for Nausea or Vomiting for up to 10 doses. (Patient not taking: Reported on 9/5/2023), Disp: 10 tablet, Rfl: 0    Spacer/Aero-Holding Chambers (AeroChamber MV) inhaler, Use as instructed (Patient not taking: Reported on 9/5/2023), Disp: 1 each, Rfl: 0    Allergies   Allergen Reactions    Ketorolac Tromethamine Other (See Comments)     \"made pain worse\" per pt       Social History     Tobacco Use    Smoking status: Never    Smokeless tobacco: Never   Substance Use Topics    Alcohol use: No    Drug use: No       Shivani Hewitt  reports that she has never smoked. She has never used smokeless tobacco..       Family History   Problem Relation Age of Onset    No Known Problems Mother     No Known Problems Father        Review of Systems   Constitutional:  Positive for fatigue.   HENT: Negative.     Eyes: Negative.    Respiratory: Negative.     Cardiovascular: Negative.    Gastrointestinal: Negative.    Endocrine: Negative.    Genitourinary:  Positive for pelvic pain and vaginal bleeding.   Musculoskeletal: Negative.    Skin: Negative.    Allergic/Immunologic: Negative.    Neurological: Negative.    Hematological: Negative.    Psychiatric/Behavioral: Negative.       Patient reports that she is not currently experiencing any symptoms of urinary incontinence.      noTESTED FOR CHLAMYDIA?    EXAM:  /72   Ht 154.9 cm (60.98\")   Wt 70.8 kg (156 lb)   LMP 08/26/2023   BMI 29.50 kg/m²     Labs:   Lab Results (last 24 hours)       Procedure Component Value Units Date/Time    POC Urinalysis Dipstick [[id number]]  (Normal) Collected: 09/05/23 1344    Specimen: Urine Updated: 09/05/23 1344     Color Yellow     Clarity, UA Clear     Glucose, UA Negative mg/dL      Bilirubin Negative     Ketones, UA Negative     Specific Gravity  1.025     Blood, UA Negative     pH, Urine 5.0     Protein, POC " Negative mg/dL      Urobilinogen, UA Normal     Leukocytes Negative     Nitrite, UA Negative    POC Pregnancy, Urine [[id number]]  (Normal) Collected: 09/05/23 1344    Specimen: Urine Updated: 09/05/23 1344     HCG, Urine, QL Negative     Lot Number [id number]     Internal Positive Control Passed     Internal Negative Control Passed     Expiration Date 11/29/24            Physical Exam  Vitals and nursing note reviewed. Exam conducted with a chaperone present.   Constitutional:       General: She is not in acute distress.     Appearance: She is well-developed. She is not diaphoretic.   HENT:      Head: Normocephalic and atraumatic.      Nose: Nose normal.   Eyes:      Extraocular Movements: Extraocular movements intact.   Cardiovascular:      Rate and Rhythm: Normal rate.   Pulmonary:      Effort: Pulmonary effort is normal.   Chest:   Breasts:     Breasts are symmetrical.      Right: Normal. No mass, nipple discharge, skin change or tenderness.      Left: Normal. No mass, nipple discharge, skin change or tenderness.   Abdominal:      General: There is no distension.      Palpations: Abdomen is soft. There is no mass.      Tenderness: There is no abdominal tenderness. There is no guarding.   Genitourinary:     General: Normal vulva.      Pubic Area: No rash.       Vagina: Normal. No vaginal discharge.      Cervix: Normal.      Uterus: Normal.       Adnexa: Right adnexa normal and left adnexa normal.          Comments: L bartholin's cyst illustrated  Musculoskeletal:         General: No tenderness or deformity. Normal range of motion.      Cervical back: Normal range of motion.   Lymphadenopathy:      Upper Body:      Right upper body: No axillary adenopathy.      Left upper body: No axillary adenopathy.   Skin:     General: Skin is warm and dry.      Coloration: Skin is not pale.      Findings: No erythema or rash.   Neurological:      Mental Status: She is alert and oriented to person, place, and time.   Psychiatric:          Behavior: Behavior normal.         Thought Content: Thought content normal.         Judgment: Judgment normal.          As part of wellness and prevention, the following topics were discussed with the patient: healthy weight, substance abuse/misuse, mental health, encouraging self breast exam, and other counseling and guidance done:  Nutrition, physical activity, healthy weight, injury prevention, misuse of tobacco, alcohol and drugs, sexual behavior and STDs, contraception, dental health, mental health, immunizations breast cancer screening and exams.    Assessment     1) GYN annual well woman exam.   2) PAP done today? Yes  3) problems addressed: yes             Plan       Follow up prn or one year.  Will initiate ovulation suppression with drospirenone and leave her bartholin's cyst for now.  Pt without complaints about this.     Pt instructed to call for results of any testing done today and that failure to call if she has not heard from us could result in inadequate treatment.  Pt verbalized her understanding.     Diagnoses and all orders for this visit:    1. Pap smear, low-risk (Primary)  -     IGP, Apt HPV,rfx 16 / 18,45    2. Routine gynecological examination  -     POC Urinalysis Dipstick  -     POC Pregnancy, Urine  -     IGP, Apt HPV,rfx 16 / 18,45    3. Special screening examination for human papillomavirus (HPV)  -     IGP, Apt HPV,rfx 16 / 18,45    4. LGSIL on Pap smear of cervix: 2020, NEG 2021    5. Bartholin's cyst: L    6. Migraine with aura, with intractable migraine, so stated, with status migrainosus    Other orders  -     Drospirenone 4 MG tablet; Take 1 tablet by mouth Daily.  Dispense: 84 tablet; Refill: 3        RTO Return in about 1 year (around 9/5/2024) for Annual physical.    I spent > 30minutes on the separately reported service of E& M.  This time includes time spent by me in the following activities: preparing for the visit, reviewing tests, obtaining and/or reviewing a  separately obtained history, performing a medically appropriate examination and/or evaluation, counseling and educating the patient/family/caregiver, ordering medications, tests, or procedures, referring and communicating with other health care professionals, documenting information in the medical record, independently interpreting results and communicating that information with the patient/family/caregiver and care coordination. This time is not included in the time used to interpret the ultrasound also reported today.      Ronald Mccrary MD  09/05/23  14:44 EDT

## 2023-09-29 PROBLEM — N93.9 ABNORMAL UTERINE BLEEDING (AUB): Status: ACTIVE | Noted: 2023-09-29

## 2023-09-29 PROBLEM — N94.6 DYSMENORRHEA: Status: ACTIVE | Noted: 2023-09-29

## 2024-09-05 ENCOUNTER — OFFICE VISIT (OUTPATIENT)
Dept: OBSTETRICS AND GYNECOLOGY | Facility: CLINIC | Age: 34
End: 2024-09-05
Payer: COMMERCIAL

## 2024-09-05 VITALS
HEIGHT: 61 IN | BODY MASS INDEX: 29.94 KG/M2 | WEIGHT: 158.6 LBS | DIASTOLIC BLOOD PRESSURE: 86 MMHG | SYSTOLIC BLOOD PRESSURE: 134 MMHG

## 2024-09-05 DIAGNOSIS — K21.9 GASTROESOPHAGEAL REFLUX IN PREGNANCY: ICD-10-CM

## 2024-09-05 DIAGNOSIS — N92.6 MISSED MENSES: Primary | ICD-10-CM

## 2024-09-05 DIAGNOSIS — Z86.79 HISTORY OF POSTPARTUM GESTATIONAL HYPERTENSION: ICD-10-CM

## 2024-09-05 DIAGNOSIS — O21.9 NAUSEA AND VOMITING DURING PREGNANCY: ICD-10-CM

## 2024-09-05 DIAGNOSIS — O99.619 GASTROESOPHAGEAL REFLUX IN PREGNANCY: ICD-10-CM

## 2024-09-05 DIAGNOSIS — J45.909 ASTHMA, UNSPECIFIED ASTHMA SEVERITY, UNSPECIFIED WHETHER COMPLICATED, UNSPECIFIED WHETHER PERSISTENT: ICD-10-CM

## 2024-09-05 DIAGNOSIS — Z87.59 HISTORY OF POSTPARTUM GESTATIONAL HYPERTENSION: ICD-10-CM

## 2024-09-05 LAB
B-HCG UR QL: POSITIVE
BILIRUB BLD-MCNC: NEGATIVE MG/DL
CLARITY, POC: CLEAR
COLOR UR: YELLOW
EXPIRATION DATE: ABNORMAL
GLUCOSE UR STRIP-MCNC: NEGATIVE MG/DL
INTERNAL NEGATIVE CONTROL: ABNORMAL
INTERNAL POSITIVE CONTROL: ABNORMAL
KETONES UR QL: NEGATIVE
LEUKOCYTE EST, POC: NEGATIVE
Lab: ABNORMAL
NITRITE UR-MCNC: NEGATIVE MG/ML
PH UR: 5 [PH] (ref 5–8)
PROT UR STRIP-MCNC: NEGATIVE MG/DL
RBC # UR STRIP: NEGATIVE /UL
SP GR UR: 1 (ref 1–1.03)
UROBILINOGEN UR QL: NORMAL

## 2024-09-05 RX ORDER — ONDANSETRON 4 MG/1
4 TABLET, ORALLY DISINTEGRATING ORAL EVERY 8 HOURS PRN
Qty: 30 TABLET | Refills: 2 | Status: SHIPPED | OUTPATIENT
Start: 2024-09-05

## 2024-09-05 RX ORDER — FAMOTIDINE 20 MG/1
20 TABLET, FILM COATED ORAL 2 TIMES DAILY
Qty: 60 TABLET | Refills: 3 | Status: SHIPPED | OUTPATIENT
Start: 2024-09-05 | End: 2025-09-05

## 2024-09-05 NOTE — PROGRESS NOTES
Confirmation of pregnancy     Chief Complaint   Patient presents with    Gynecologic Exam     Confirmation          Shivani Hewitt is being seen today for evaluation of absence of menses. Due to her period being late, she tested for pregnancy and had + home UPT. She is a 34 y.o. . This problem is new to me, the examiner.       OB History          3    Para   2    Term   2            AB        Living   2         SAB        IAB        Ectopic        Molar        Multiple        Live Births   2          Obstetric Comments    x 2- proven pelvis 8#2oz  Hx of GHTN post-delivery- received Mag               HPI     LNMP: 6/15/2024  Confident with date: No  Taking prenatal vitamins: Yes. Needs RX: No  Planned pregnancy: No  Prior obstetric issues, potential pregnancy concerns:  x 2; hx of GHTN  Family history of genetic issues (includes FOB): no  Varicella Hx: yes  Flu vaccine: no  COVID 19 vaccine: no. Booster vaccine: no  History of STDs: no  Current medications: PNV, inhaler  Last pap smear: 2023- NIL  Smoker: No  Drug or alcohol abuse: No  H/O physical, emotional or sexual abuse: no  H/O mental health disorder: denies  Prior testing for Cystic Fibrosis Carrier or Sickle Cell Trait- unk  Prepregnancy BMI - Body mass index is 29.98 kg/m².    Past Medical History:   Diagnosis Date    Asthma        Past Surgical History:   Procedure Laterality Date    CHOLECYSTECTOMY           Current Outpatient Medications:     albuterol sulfate  (90 Base) MCG/ACT inhaler, Inhale 2 puffs Every 4 (Four) Hours As Needed., Disp: , Rfl:     famotidine (Pepcid) 20 MG tablet, Take 1 tablet by mouth 2 (Two) Times a Day., Disp: 60 tablet, Rfl: 3    Fluticasone-Salmeterol (ADVAIR) 250-50 MCG/ACT DISKUS, Inhale 1 puff. (Patient not taking: Reported on 2023), Disp: , Rfl:     ondansetron ODT (ZOFRAN-ODT) 4 MG disintegrating tablet, Place 1 tablet on the tongue Every 8 (Eight) Hours As Needed for Nausea or  "Vomiting., Disp: 30 tablet, Rfl: 2    Spacer/Aero-Holding Chambers (AeroChamber MV) inhaler, Use as instructed (Patient not taking: Reported on 9/5/2023), Disp: 1 each, Rfl: 0    Allergies   Allergen Reactions    Ketorolac Tromethamine Other (See Comments)     \"made pain worse\" per pt       Social History     Socioeconomic History    Marital status:    Tobacco Use    Smoking status: Never    Smokeless tobacco: Never   Vaping Use    Vaping status: Never Used   Substance and Sexual Activity    Alcohol use: No    Drug use: No    Sexual activity: Yes     Partners: Male       Family History   Problem Relation Age of Onset    No Known Problems Father     Diabetes Mother        Review of systems     Review of Systems   Constitutional:  Positive for chills and fever.   Respiratory:  Positive for cough and shortness of breath (occasional).    Gastrointestinal:  Positive for nausea and vomiting.   Endocrine: Positive for cold intolerance and heat intolerance.   Genitourinary:  Positive for menstrual problem. Negative for vaginal bleeding.       Objective    /86   Ht 154.9 cm (60.98\")   Wt 71.9 kg (158 lb 9.6 oz)   LMP 06/15/2024 (Approximate)   Breastfeeding No   BMI 29.98 kg/m²     Physical Exam  Vitals reviewed.   Constitutional:       General: She is awake. She is not in acute distress.     Appearance: She is not ill-appearing.   Eyes:      Conjunctiva/sclera: Conjunctivae normal.   Pulmonary:      Effort: Pulmonary effort is normal. No respiratory distress.   Musculoskeletal:      Cervical back: Neck supple. No rigidity.   Skin:     General: Skin is warm and dry.      Capillary Refill: Capillary refill takes less than 2 seconds.   Neurological:      Mental Status: She is alert and oriented to person, place, and time.   Psychiatric:         Mood and Affect: Mood and affect normal.         Behavior: Behavior normal.         Assessment/Plan      Missed menses: + UPT in office. LNMP 6/15/2024 = 11w5d EGA " with an EDC=3/22/2025. US confirms IUP with +FCA: Yes. IMP: viable IUP. CRL kristina 4.9cm c/w 11w5d. FHR 168bpm. UT anteverted. OV WNL. Oriented to practice.     Pregnancy: Disc importance of regular prenatal care. Enc PNV daily. Counseled on providers and on call phone. Disc Tylenol products are ok and encouraged no ibuprofen or ASA in pregnancy.  Disc exercise in pregnancy, diet, expected weight gain, etc. Enc no use of tobacco, vaping, drugs, or alcohol during pregnancy. Rev warn s/s of SAB.     Labs: Pt counseled on genetic screening, Quad screen, AFP, and NIPS.     Body mass index is 29.98 kg/m². Overweight women, with a BMI of 25-29, should gain approx 15-25 pounds for the entire pregnancy.        Smoker- non-smoker    6.   COVID19 precautions were reviewed with the patient. Continue to encourage good hand hygiene.  Hand hygeine performed before and after seeing the patient. Also encouraged COVID booster vaccine at 6 month interval from last COVID vaccine.     7.  Flu vaccine. Encouraged the flu vaccine during pregnancy. Discuss normal physiological changes during pregnancy increase the susceptibility of the flu virus and increase the risk of severe illness for the pregnant woman. Disc flu can be harmful to the unborn baby as well. Enc the flu vaccine. Disc with patient that getting the flu vaccine is the first and most important step in protecting against the flu. Flu vaccines given during pregnancy help protect both the mother and the baby. Getting the flu vaccine during pregnancy also helps protect the  from flu illness for several months after their birth, when then are too young to get vaccinated. Also disc the importance of good hand hygiene and avoiding people who are sick. The patient has not received the flu vaccine.     8. N/V- ERX Zofran and Pepcid BID    9. Asthma- feels like she has mucous in lungs and throat; occasional SOA with walking; tested neg for COVID/flu at PCP office; has inhaler but  hasn't used it; suppose to be taking long-term inhaler but hasn't used it; last saw pulmonologist in December 2023; rec OTC Mucinex and make appt with pulmonologist for f/u.  Instructed to go to ED if SOA worsens    10.  Hx of GHTN- post delivery; was on Mag; need CMP, P/C ratio with new OB; rec to start baby asa daily    All questions answered.     Counseling was given to patient and partner  for the following topics: diagnostic results, instructions for management, risk factor reductions, prognosis, patient and family education, impressions, risks and benefits of treatment options, and importance of treatment compliance . Total time of the encounter was 30 minutes and 25 minutes was spent counseling.  This time does not include time spent performing ultrasound.    Encounter Diagnoses   Name Primary?    Missed menses Yes    Nausea and vomiting during pregnancy     Gastroesophageal reflux in pregnancy     Asthma, unspecified asthma severity, unspecified whether complicated, unspecified whether persistent     History of postpartum gestational hypertension        Diagnoses and all orders for this visit:    Missed menses  -     POC Pregnancy, Urine  -     POC Urinalysis Dipstick    Nausea and vomiting during pregnancy  -     ondansetron ODT (ZOFRAN-ODT) 4 MG disintegrating tablet; Place 1 tablet on the tongue Every 8 (Eight) Hours As Needed for Nausea or Vomiting.    Gastroesophageal reflux in pregnancy  -     famotidine (Pepcid) 20 MG tablet; Take 1 tablet by mouth 2 (Two) Times a Day.    Asthma, unspecified asthma severity, unspecified whether complicated, unspecified whether persistent    History of postpartum gestational hypertension    Other orders  -     Cancel: POC Urinalysis Dipstick        RTO in 1 weeks for new OB exam/Pap, labs    Araceli Starkey, APRN  9/5/2024  12:55 EDT

## 2024-10-14 ENCOUNTER — INITIAL PRENATAL (OUTPATIENT)
Dept: OBSTETRICS AND GYNECOLOGY | Facility: CLINIC | Age: 34
End: 2024-10-14
Payer: COMMERCIAL

## 2024-10-14 VITALS — SYSTOLIC BLOOD PRESSURE: 114 MMHG | WEIGHT: 162.4 LBS | BODY MASS INDEX: 30.7 KG/M2 | DIASTOLIC BLOOD PRESSURE: 76 MMHG

## 2024-10-14 DIAGNOSIS — O09.32 INITIAL OBSTETRIC VISIT IN SECOND TRIMESTER: ICD-10-CM

## 2024-10-14 DIAGNOSIS — Z11.51 SCREENING FOR HUMAN PAPILLOMAVIRUS (HPV): ICD-10-CM

## 2024-10-14 DIAGNOSIS — R00.0 TACHYCARDIA: Primary | ICD-10-CM

## 2024-10-14 DIAGNOSIS — Z36.9 ENCOUNTER FOR ANTENATAL SCREENING, UNSPECIFIED: ICD-10-CM

## 2024-10-14 LAB
EXTERNAL CYSTIC FIBROSIS: NEGATIVE
EXTERNAL NIPT: NORMAL

## 2024-10-14 RX ORDER — ASPIRIN 81 MG/1
81 TABLET, CHEWABLE ORAL DAILY
Qty: 90 TABLET | Refills: 3 | Status: SHIPPED | OUTPATIENT
Start: 2024-10-14

## 2024-10-14 RX ORDER — FLUCONAZOLE 150 MG/1
150 TABLET ORAL ONCE
Qty: 1 TABLET | Refills: 0 | Status: SHIPPED | OUTPATIENT
Start: 2024-10-14 | End: 2024-10-14

## 2024-10-14 NOTE — PROGRESS NOTES
Initial ob visit     Chief Complaint   Patient presents with    Initial Prenatal Visit       Shivani Hewitt is being seen today for her first obstetrical visit.  She is a 34 y.o.    17w2d gestation. This problem is new to me: yes      OB History          3    Para   2    Term   2            AB        Living   2         SAB        IAB        Ectopic        Molar        Multiple        Live Births   2          Obstetric Comments    x 2- proven pelvis 8#2oz  Hx of GHTN post-delivery- received Mag             LNMP: 6/15/2024  Confident with date: No  Taking prenatal vitamins: Yes. Needs RX: No  Planned pregnancy: No  Prior obstetric issues, potential pregnancy concerns:  x 2; hx of GHTN  Family history of genetic issues (includes FOB): no  Varicella Hx: yes  Flu vaccine: no  COVID 19 vaccine: no. Booster vaccine: no  History of STDs: no  Current medications: PNV, inhaler  Last pap smear: 2023- NIL  Smoker: No  Drug or alcohol abuse: No  H/O physical, emotional or sexual abuse: no  H/O mental health disorder: denies  Prior testing for Cystic Fibrosis Carrier or Sickle Cell Trait- unk  Prepregnancy BMI: Body mass index is 30.7 kg/m².      Past Medical History:   Diagnosis Date    Asthma        Past Surgical History:   Procedure Laterality Date    CHOLECYSTECTOMY           Current Outpatient Medications:     albuterol sulfate  (90 Base) MCG/ACT inhaler, Inhale 2 puffs Every 4 (Four) Hours As Needed., Disp: , Rfl:     famotidine (Pepcid) 20 MG tablet, Take 1 tablet by mouth 2 (Two) Times a Day., Disp: 60 tablet, Rfl: 3    ondansetron ODT (ZOFRAN-ODT) 4 MG disintegrating tablet, Place 1 tablet on the tongue Every 8 (Eight) Hours As Needed for Nausea or Vomiting., Disp: 30 tablet, Rfl: 2    Fluticasone-Salmeterol (ADVAIR) 250-50 MCG/ACT DISKUS, Inhale 1 puff. (Patient not taking: Reported on 10/14/2024), Disp: , Rfl:     Spacer/Aero-Holding Chambers (AeroChamber MV) inhaler, Use as  "instructed (Patient not taking: Reported on 10/14/2024), Disp: 1 each, Rfl: 0    Allergies   Allergen Reactions    Ketorolac Tromethamine Other (See Comments)     \"made pain worse\" per pt       Social History     Socioeconomic History    Marital status:    Tobacco Use    Smoking status: Never    Smokeless tobacco: Never   Vaping Use    Vaping status: Never Used   Substance and Sexual Activity    Alcohol use: No    Drug use: No    Sexual activity: Yes     Partners: Male       Family History   Problem Relation Age of Onset    No Known Problems Father     Diabetes Mother        Review of systems     All other systems reviewed and are negative except for: Cardiovascular: positive for tachycardia, reports -150 BPM  Gastrointestinal: positive for nausea and vomiting     Objective    /76   Wt 73.7 kg (162 lb 6.4 oz)   LMP 06/15/2024 (Approximate)   BMI 30.70 kg/m²       General Appearance:    Alert, cooperative, in no acute distress, habitus obese    Head:    Not examined   Eyes:           Not examined   Ears:  Not examined       Neck:  No thyroid enlargement or nodules present   Back:     No kyphosis present, no scoliosis present,                       Lungs:     Clear to auscultation,respirations regular, even and                   unlabored    Heart:    Regular rhythm and normal rate, normal S1 and S2, no            murmur, no gallop, no rub, no click   Breast Exam:    Def   Abdomen:     Normal bowel sounds, no masses, no organomegaly, soft        non-tender, non-distended, no guarding, no rebound                 tenderness   Genitalia:    Vulva - No masses, no atrophy, no lesions    Vagina - Erythematous, white thick discharger.  No bleeding    Cervix -Friable with pap.  Pap collected:Yes     Uterus - Consistent with 17 weeks.     Adnexa - No masses, non tender       Extremities:   Moves all extremities well, no edema, no cyanosis, no              redness       Skin:   No bleeding, bruising or " rash       Neurologic:   Sensation intact, A&O times 3      Assessment/Plan    1) Pregnancy at 17w2d- EDC established 3/22/25 and confirmed by US and LNMP.     2) OB exam: OB exam completed: Yes. New OB bag provided Yes. Pap collected: Yes.    3) Labs: OB labs collected: Yes Counseled on genetic screening: Yes, she desires CF/SMA/FX. Counseled on Quad screen and AFP: Yes, she desires AFP. Counseled on NIPS: Yes, she desires NIPS.      4) Body mass index is 30.7 kg/m². Obese women with a pre-pregnancy BMI of 30+ should strive to gain approx 11-20 pounds for the entire pregnancy.     5)  Prenatal care: Oriented to the office and prenatal care. Encourage prenatal vitamins. Disc Tylenol products are fine, avoid aspirin and ibuprofen; Zika (travel restrictions/ok to use insect repellant); not to change cat litter; food restrictions; exercise;  avoidance of alcohol, tobacco, drugs and saunas/hot tubs.     6) COVID19 vaccine declined     7) Flu vaccine declined     8) N/V- Taking Zofran and Pepcid BID     9. Asthma-  Has UTD inhaler. Safe med list provided.      10.  Hx of GHTN- post delivery; was on Mag; Check CMP and P/C ratio today ; rec to start baby asa daily    11. (L) bartholin's cyst- Enc warm compresses. Pt has had it drained x 1 and the area returned.     12.  Yeast- Noted on exam. Fluconazole erx sent.      13.  H/O abnormal pap- Reports in [place]- reports it was recommended she had a hysterectomy based on biopsy results.  Request pap records from [place]. Had two normal paps in 2021 and 2023.     14.  H/O panic and PTSD- No counselor. NO current meds.     15.  Racing heart- Reports -150s sometimes.  Check Thyroid panel. Ref to cardiology .     All questions answered.     RTO 3 weeks     PRAVIN Bates  10/14/2024  13:25 EDT

## 2024-10-15 LAB
ABO GROUP BLD: NORMAL
ALBUMIN SERPL-MCNC: 4 G/DL (ref 3.5–5.2)
ALBUMIN/GLOB SERPL: 1.3 G/DL
ALP SERPL-CCNC: 74 U/L (ref 39–117)
ALT SERPL-CCNC: 30 U/L (ref 1–33)
AST SERPL-CCNC: 28 U/L (ref 1–32)
BASOPHILS # BLD AUTO: 0 X10E3/UL (ref 0–0.2)
BASOPHILS NFR BLD AUTO: 0 %
BILIRUB SERPL-MCNC: 0.4 MG/DL (ref 0–1.2)
BLD GP AB SCN SERPL QL: NEGATIVE
BUN SERPL-MCNC: 5 MG/DL (ref 6–20)
BUN/CREAT SERPL: 8.9 (ref 7–25)
CALCIUM SERPL-MCNC: 9.3 MG/DL (ref 8.6–10.5)
CHLORIDE SERPL-SCNC: 102 MMOL/L (ref 98–107)
CO2 SERPL-SCNC: 20.5 MMOL/L (ref 22–29)
CREAT SERPL-MCNC: 0.56 MG/DL (ref 0.57–1)
CREAT UR-MCNC: 125 MG/DL
EGFRCR SERPLBLD CKD-EPI 2021: 123.8 ML/MIN/1.73
EOSINOPHIL # BLD AUTO: 0.1 X10E3/UL (ref 0–0.4)
EOSINOPHIL NFR BLD AUTO: 1 %
ERYTHROCYTE [DISTWIDTH] IN BLOOD BY AUTOMATED COUNT: 12.9 % (ref 11.7–15.4)
GLOBULIN SER CALC-MCNC: 3 GM/DL
GLUCOSE SERPL-MCNC: 83 MG/DL (ref 65–99)
HBA1C MFR BLD: 5.6 % (ref 4.8–5.6)
HBV SURFACE AG SERPL QL IA: NEGATIVE
HCT VFR BLD AUTO: 40.4 % (ref 34–46.6)
HCV IGG SERPL QL IA: NON REACTIVE
HGB A MFR BLD ELPH: 97.3 % (ref 96.4–98.8)
HGB A2 MFR BLD ELPH: 2.7 % (ref 1.8–3.2)
HGB BLD-MCNC: 13.9 G/DL (ref 11.1–15.9)
HGB F MFR BLD ELPH: 0 % (ref 0–2)
HGB FRACT BLD-IMP: NORMAL
HGB S MFR BLD ELPH: 0 %
HIV 1+2 AB+HIV1 P24 AG SERPL QL IA: NON REACTIVE
IMM GRANULOCYTES # BLD AUTO: 0.1 X10E3/UL (ref 0–0.1)
IMM GRANULOCYTES NFR BLD AUTO: 1 %
LYMPHOCYTES # BLD AUTO: 2.1 X10E3/UL (ref 0.7–3.1)
LYMPHOCYTES NFR BLD AUTO: 18 %
MCH RBC QN AUTO: 32.5 PG (ref 26.6–33)
MCHC RBC AUTO-ENTMCNC: 34.4 G/DL (ref 31.5–35.7)
MCV RBC AUTO: 94 FL (ref 79–97)
MONOCYTES # BLD AUTO: 0.7 X10E3/UL (ref 0.1–0.9)
MONOCYTES NFR BLD AUTO: 6 %
NEUTROPHILS # BLD AUTO: 8.5 X10E3/UL (ref 1.4–7)
NEUTROPHILS NFR BLD AUTO: 74 %
PLATELET # BLD AUTO: 250 X10E3/UL (ref 150–450)
POTASSIUM SERPL-SCNC: 3.7 MMOL/L (ref 3.5–5.2)
PROT SERPL-MCNC: 7 G/DL (ref 6–8.5)
PROT UR-MCNC: 18.6 MG/DL
PROT/CREAT UR: 148.8 MG/G CREA (ref 0–200)
RBC # BLD AUTO: 4.28 X10E6/UL (ref 3.77–5.28)
RH BLD: POSITIVE
RPR SER QL: NON REACTIVE
RUBV IGG SERPL IA-ACNC: 9.47 INDEX
SODIUM SERPL-SCNC: 137 MMOL/L (ref 136–145)
T3 SERPL-MCNC: 227 NG/DL (ref 80–200)
T4 FREE SERPL-MCNC: 0.85 NG/DL (ref 0.92–1.68)
THYROPEROXIDASE AB SERPL-ACNC: <9 IU/ML (ref 0–34)
TSH SERPL DL<=0.005 MIU/L-ACNC: 1.2 UIU/ML (ref 0.27–4.2)
VZV IGG SER QL IA: REACTIVE
WBC # BLD AUTO: 11.4 X10E3/UL (ref 3.4–10.8)

## 2024-10-16 LAB
AMPHETAMINES UR QL SCN: NEGATIVE NG/ML
BACTERIA UR CULT: NORMAL
BACTERIA UR CULT: NORMAL
BARBITURATES UR QL SCN: NEGATIVE NG/ML
BENZODIAZ UR QL SCN: NEGATIVE NG/ML
BUPRENORPHINE UR QL: NEGATIVE NG/ML
BZE UR QL SCN: NEGATIVE NG/ML
CANNABINOIDS UR QL SCN: NEGATIVE NG/ML
CREAT UR-MCNC: 131.1 MG/DL (ref 20–300)
FENTANYL UR-MCNC: NEGATIVE PG/ML
LABORATORY COMMENT REPORT: NORMAL
MEPERIDINE UR QL: NEGATIVE NG/ML
METHADONE UR QL SCN: NEGATIVE NG/ML
OPIATES UR QL SCN: NEGATIVE NG/ML
OXYCODONE+OXYMORPHONE UR QL SCN: NEGATIVE NG/ML
PCP UR QL: NEGATIVE NG/ML
PH UR: 6 [PH] (ref 4.5–8.9)
PROPOXYPH UR QL SCN: NEGATIVE NG/ML
TRAMADOL UR QL SCN: NEGATIVE NG/ML

## 2024-10-17 LAB
C TRACH RRNA CVX QL NAA+PROBE: NEGATIVE
CYTOLOGIST CVX/VAG CYTO: NORMAL
CYTOLOGY CVX/VAG DOC CYTO: NORMAL
CYTOLOGY CVX/VAG DOC THIN PREP: NORMAL
DX ICD CODE: NORMAL
HPV GENOTYPE REFLEX: NORMAL
HPV I/H RISK 4 DNA CVX QL PROBE+SIG AMP: NEGATIVE
Lab: NORMAL
N GONORRHOEA RRNA CVX QL NAA+PROBE: NEGATIVE
OTHER STN SPEC: NORMAL
STAT OF ADQ CVX/VAG CYTO-IMP: NORMAL
T VAGINALIS RRNA SPEC QL NAA+PROBE: NEGATIVE

## 2024-10-19 LAB
CFDNA.FET/CFDNA.TOTAL SFR FETUS: NORMAL %
CITATION REF LAB TEST: NORMAL
FET 13+18+21+X+Y ANEUP PLAS.CFDNA: NEGATIVE
FET CHR 21 TS PLAS.CFDNA QL: NEGATIVE
FET SEX PLAS.CFDNA DOSAGE CFDNA: NORMAL
FET TS 13 RISK PLAS.CFDNA QL: NEGATIVE
FET TS 18 RISK WBC.DNA+CFDNA QL: NEGATIVE
GA EST FROM CONCEPTION DATE: NORMAL D
GESTATIONAL AGE > 9:: YES
LAB DIRECTOR NAME PROVIDER: NORMAL
LAB DIRECTOR NAME PROVIDER: NORMAL
LABORATORY COMMENT REPORT: NORMAL
LIMITATIONS OF THE TEST: NORMAL
NEGATIVE PREDICTIVE VALUE: NORMAL
NOTE: NORMAL
PERFORMANCE CHARACTERISTICS: NORMAL
POSITIVE PREDICTIVE VALUE: NORMAL
REF LAB TEST METHOD: NORMAL
TEST PERFORMANCE INFO SPEC: NORMAL

## 2024-10-28 LAB
CITATION REF LAB TEST: NORMAL
GENDER IDENTITY: NORMAL
GENE DIS ANL CARRIER INTERP-IMP: NORMAL
GENE STUDIED ID: NORMAL
GENETIC SCN SPEC: NORMAL
LAB DIRECTOR NAME PROVIDER: NORMAL
Lab: NORMAL
REASON FOR REFERRAL (NARRATIVE): NORMAL
RECOMMENDATION PATIENT DOC-IMP: NORMAL
REF LAB TEST METHOD: NORMAL
SERVICE CMNT-IMP: NORMAL
SPECIMEN SOURCE: NORMAL

## 2024-10-30 ENCOUNTER — ROUTINE PRENATAL (OUTPATIENT)
Dept: OBSTETRICS AND GYNECOLOGY | Facility: CLINIC | Age: 34
End: 2024-10-30

## 2024-10-30 ENCOUNTER — OFFICE VISIT (OUTPATIENT)
Dept: CARDIOLOGY | Facility: CLINIC | Age: 34
End: 2024-10-30

## 2024-10-30 VITALS
SYSTOLIC BLOOD PRESSURE: 126 MMHG | HEART RATE: 98 BPM | OXYGEN SATURATION: 100 % | WEIGHT: 164.7 LBS | DIASTOLIC BLOOD PRESSURE: 82 MMHG | BODY MASS INDEX: 31.1 KG/M2 | HEIGHT: 61 IN

## 2024-10-30 VITALS — DIASTOLIC BLOOD PRESSURE: 70 MMHG | WEIGHT: 165.8 LBS | BODY MASS INDEX: 31.33 KG/M2 | SYSTOLIC BLOOD PRESSURE: 108 MMHG

## 2024-10-30 DIAGNOSIS — Z86.79 HISTORY OF POSTPARTUM GESTATIONAL HYPERTENSION: ICD-10-CM

## 2024-10-30 DIAGNOSIS — R07.2 PRECORDIAL PAIN: ICD-10-CM

## 2024-10-30 DIAGNOSIS — I49.1 PREMATURE ATRIAL COMPLEXES: ICD-10-CM

## 2024-10-30 DIAGNOSIS — Z34.92 PRENATAL CARE IN SECOND TRIMESTER: Primary | ICD-10-CM

## 2024-10-30 DIAGNOSIS — O23.40 URINARY TRACT INFECTION IN MOTHER DURING PREGNANCY, ANTEPARTUM: ICD-10-CM

## 2024-10-30 DIAGNOSIS — R06.09 DYSPNEA ON EXERTION: ICD-10-CM

## 2024-10-30 DIAGNOSIS — B37.9 YEAST INFECTION: ICD-10-CM

## 2024-10-30 DIAGNOSIS — R00.2 PALPITATIONS: Primary | ICD-10-CM

## 2024-10-30 DIAGNOSIS — J45.20 MILD INTERMITTENT ASTHMA WITHOUT COMPLICATION: ICD-10-CM

## 2024-10-30 DIAGNOSIS — Z36.9 ENCOUNTER FOR ANTENATAL SCREENING, UNSPECIFIED: ICD-10-CM

## 2024-10-30 DIAGNOSIS — O21.9 NAUSEA AND VOMITING DURING PREGNANCY: ICD-10-CM

## 2024-10-30 DIAGNOSIS — Z87.59 HISTORY OF POSTPARTUM GESTATIONAL HYPERTENSION: ICD-10-CM

## 2024-10-30 LAB
BILIRUB BLD-MCNC: NEGATIVE MG/DL
CLARITY, POC: ABNORMAL
COLOR UR: ABNORMAL
GLUCOSE UR STRIP-MCNC: NEGATIVE MG/DL
GP B STREP RRNA SPEC QL PROBE: NORMAL
KETONES UR QL: NEGATIVE
LEUKOCYTE EST, POC: NEGATIVE
NITRITE UR-MCNC: POSITIVE MG/ML
PH UR: 7 [PH] (ref 5–8)
PROT UR STRIP-MCNC: ABNORMAL MG/DL
RBC # UR STRIP: NEGATIVE /UL
SP GR UR: 1.02 (ref 1–1.03)
UROBILINOGEN UR QL: NORMAL

## 2024-10-30 PROCEDURE — 93000 ELECTROCARDIOGRAM COMPLETE: CPT | Performed by: STUDENT IN AN ORGANIZED HEALTH CARE EDUCATION/TRAINING PROGRAM

## 2024-10-30 PROCEDURE — 99204 OFFICE O/P NEW MOD 45 MIN: CPT | Performed by: STUDENT IN AN ORGANIZED HEALTH CARE EDUCATION/TRAINING PROGRAM

## 2024-10-30 RX ORDER — FLUCONAZOLE 150 MG/1
150 TABLET ORAL ONCE
Qty: 1 TABLET | Refills: 0 | Status: SHIPPED | OUTPATIENT
Start: 2024-10-30 | End: 2024-10-30

## 2024-10-30 RX ORDER — PRENATAL VIT NO.126/IRON/FOLIC 28MG-0.8MG
TABLET ORAL DAILY
COMMUNITY

## 2024-10-30 RX ORDER — DIPHENHYDRAMINE HYDROCHLORIDE 25 MG/1
25 CAPSULE ORAL NIGHTLY
Qty: 60 TABLET | Refills: 2 | Status: SHIPPED | OUTPATIENT
Start: 2024-10-30

## 2024-10-30 RX ORDER — NITROFURANTOIN 25; 75 MG/1; MG/1
100 CAPSULE ORAL 2 TIMES DAILY
Qty: 14 CAPSULE | Refills: 0 | Status: SHIPPED | OUTPATIENT
Start: 2024-10-30 | End: 2024-11-06

## 2024-10-30 NOTE — PROGRESS NOTES
"OB follow up < 20 weeks    Chief Complaint   Patient presents with    Routine Prenatal Visit       Shivani Hewitt is a 34 y.o.  19w4d being seen today for her obstetrical visit.  Patient reports headache, nausea, vomiting, and not gaining weight . Taking +PNV.  Here for anatomy scan.     Review of Systems    Genitourinary: Neg for cramping, vaginal bleeding, SROM, or dysuria.   Allergies   Allergen Reactions    Ketorolac Tromethamine Other (See Comments)     \"made pain worse\" per pt       /70   Wt 75.2 kg (165 lb 12.8 oz)   LMP 06/15/2024 (Approximate)   BMI 31.33 kg/m²     FHT: 141 BPM   Uterine Size:      Assessment    1) Pregnancy at 19w4d - fetal profile not seen and RVOT is suboptimal (fetal anatomy otherwise WNL); CL-4.27cm    2) CF/FX/SMA neg, MT21 neg; AFP drawn today    3) N/V- Taking Zofran and Pepcid BID with minimal relief; ERX Unisom/B6     4) Asthma-  Has UTD inhaler. Safe med list provided.      5)  Hx of GHTN- post delivery; was on Mag; Baseline CMP WNL, P/C vlcut=900; Taking baby asa daily     6) (L) bartholin's cyst- Enc warm compresses. Pt has had it drained x 1 and the area returned. Offered/declined evaluation today     7)  Yeast- still having itching. Fluconazole erx sent.      8)  H/O abnormal pap- Reports in [place]- reports it was recommended she had a hysterectomy based on biopsy results.  Request pap records from [place]. Had two normal paps in  and .      9)  H/O panic and PTSD- No counselor. NO current meds.  Reports she feels stable today.     10)  Racing heart- Reports -150s sometimes. Thyroid panel WNL (T3/T4) slightly low but says it's always \"slightly low\" per labs drawn at endocrinology in the past.  Repeat in 1 month.  Ref to cardiology; had appt today- reviewed note.  Previous work-up in  was normal.  ECG today was normal; scheduled for ECHO and heart monitor.    11) UTI- she is asymptomatic; check urine cx; ERX " Macrobid      Plan    Continue prenatal vitamins   Reviewed this stage of pregnancy  Problem list updated   Follow up in 4 weeks for OBT, US to complete anatomy scan and repeat thyroid labs    Parts of this document have been copied or forwarded from her previous visits and have been reviewed, updated and edited as indicated.      I spent 30 minutes on this encounter, before, during and after the visit, evaluating the patient, reviewing records and writing orders.     Araceli Starkey, APRN     10/31/2024  16:50 EDT

## 2024-10-30 NOTE — PROGRESS NOTES
[facility]    Subjective:     Encounter Date:10/30/24      Patient ID: Shivani Hewitt is a 34 y.o. female.    Chief Complaint:   Chief Complaint   Patient presents with    New patient      History of Present Illness    Shivani Hewitt is a 34 y.o. lady who presents for further evaluation of dyspnea and chest pain.  She is currently 19 weeks pregnant.  She reports that dating back many years she has had intermittent palpitations and these chest heaviness complaints and chest pain.  She describes a symptom of intermittent chest heaviness where she has a difficult time taking a deep breath then.  Is not particular exertional but sometimes it happens when she exerts herself, but usually happens at rest.  She has a separate issue with intermittent dizziness.  She reports she will feel dizzy, and then she will feel her heart start racing.  She feels a very prominent heartbeat, no particular skipped beat sensations but heart racing is the main complaint.  She does not have any way to track her heart rate at home but she has an rishabh on her phone that uses the camera to calculate her heart rate.  She is not sure how accurate it is.  Initial heart rate as high as 170.  This is happening on a nearly daily occurrence.    She has been evaluated with [facility] cardiology in the past for similar complaints.  She is evaluated for chest pain in March 2022.  With Dr. Nova Burciaga.  She described an aching chest discomfort sensation that sometimes will go to her neck jaw shoulders arms or back.  She was initially recommended to not undergo further testing, however her symptoms are persistent.  She underwent echocardiogram as well as a CT angiogram of the chest which were both unremarkable.  A TTE was completed on April 11, 2022:  Her LVEF was 60 to 65%.  Normal LV and RV function.  Normal mitral valve, normal aortic valve trileaflet, normal right ventricular size and systolic function.  The RVSP was 33 mmHg with a  "estimated right atrial pressure of 10.  CTA chest at that time was also normal.        Previous Cardiac Testing:  Per above    The following portions of the patient's history were reviewed and updated as appropriate: allergies, current medications, past family history, past medical history, past social history, past surgical history and problem list.    Past Medical History:   Diagnosis Date    Asthma        Past Surgical History:   Procedure Laterality Date    CHOLECYSTECTOMY             ECG 12 Lead    Date/Time: 10/30/2024 11:43 AM  Performed by: Alexandre Lund MD    Authorized by: Alexandre Lund MD  Comparison: compared with previous ECG from 3/5/2022  Similar to previous ECG  Comparison to previous ECG: No PACs are noted on today's ECG.  Rhythm: sinus rhythm  Rate: normal  Conduction: conduction normal  ST Segments: ST segments normal  T Waves: T waves normal  QRS axis: normal  Other: no other findings    Clinical impression: normal ECG             Objective:     Vitals:    10/30/24 1134   BP: 126/82   Pulse: 98   SpO2: 100%   Weight: 74.7 kg (164 lb 11.2 oz)   Height: 154.9 cm (61\")         Constitutional:       Appearance: Healthy appearance. Not in distress.   Neck:      Vascular: JVD normal.   Pulmonary:      Effort: Pulmonary effort is normal.      Breath sounds: Normal breath sounds.   Cardiovascular:      PMI at left midclavicular line. Normal rate. Regular rhythm. Normal S2.       Murmurs: There is no murmur.   Pulses:     Intact distal pulses.   Edema:     Peripheral edema absent.   Skin:     General: Skin is warm and dry.   Neurological:      General: No focal deficit present.      Mental Status: Alert, oriented to person, place, and time and oriented to person, place and time.   Psychiatric:         Mood and Affect: Mood and affect normal.         Lab Review:       BUN   Date Value Ref Range Status   10/14/2024 5 (L) 6 - 20 mg/dL Final   03/09/2021 13 6 - 20 mg/dL Final   10/31/2018 13 7 - 20 mg/dL " Final     Creatinine   Date Value Ref Range Status   10/14/2024 0.56 (L) 0.57 - 1.00 mg/dL Final   03/09/2021 0.56 (L) 0.57 - 1.00 mg/dL Final   10/31/2018 0.7 0.7 - 1.5 mg/dL Final     Potassium   Date Value Ref Range Status   10/14/2024 3.7 3.5 - 5.2 mmol/L Final   03/09/2021 3.7 3.5 - 5.2 mmol/L Final   10/31/2018 3.4 (L) 3.5 - 5.1 mmol/L Final     ALT (SGPT)   Date Value Ref Range Status   10/14/2024 30 1 - 33 U/L Final   03/09/2021 35 (H) 1 - 33 U/L Final   10/31/2018 83 (H) 13 - 69 U/L Final     AST (SGOT)   Date Value Ref Range Status   10/14/2024 28 1 - 32 U/L Final   03/09/2021 24 1 - 32 U/L Final   10/31/2018 58 (H) 15 - 46 U/L Final         Performed        Assessment:          Diagnosis Plan   1. Palpitations  ECG 12 Lead    Adult Transthoracic Echo Limited W/ Cont if Necessary Per Protocol    Holter Monitor - 72 Hour Up To 15 Days      2. Premature atrial complexes        3. Dyspnea on exertion  Adult Transthoracic Echo Limited W/ Cont if Necessary Per Protocol    Holter Monitor - 72 Hour Up To 15 Days      4. Precordial pain               Plan:         Palpitations: Probably either anxiety or sinus tachycardia.  She did have an atrial premature contraction on ECG in the past, but my suspicion for sustained arrhythmias is pretty low with a normal ECG.  She does report that her smart phone has detected an elevated heart rate, but I do doubt the accuracy of this since it is using the camera to calculate her heart rate which is probably prone to inaccuracy.  Will arrange for 5-day Zio patch for definitive evaluation  Dyspnea on exertion, chest pressure: It does not particularly fit a pattern of angina.  This would be low risk for any sort of myocardial ischemia.  She does not meet guideline recommendations for ischemic testing.  It sounds more of an air hunger sensation.  She did have a borderline elevated RVSP in the past on an echo in 2022,  Will repeat a limited echo to reassess LVEF and RVSP only.   Will need to rule out any sort of sinister causes of dyspnea and air hunger like pulmonary hypertension and otherwise young female.  Thankfully her exam today does not show any significant findings of CHF  Pregnancy: Per OB/GYN.      RTC as needed after testing.  Will obtain limited echo and the 5-day ZIO.  If these are unremarkable, patient to be reassured that there does not appear to be a cardiac explanation of her symptoms.      Alexandre Lund MD  [facility]  10/30/24  11:42 EDT       Current Outpatient Medications:     albuterol sulfate  (90 Base) MCG/ACT inhaler, Inhale 2 puffs Every 4 (Four) Hours As Needed., Disp: , Rfl:     aspirin 81 MG chewable tablet, Chew 1 tablet Daily., Disp: 90 tablet, Rfl: 3    Fluticasone-Salmeterol (ADVAIR) 250-50 MCG/ACT DISKUS, Inhale 1 puff., Disp: , Rfl:     ondansetron ODT (ZOFRAN-ODT) 4 MG disintegrating tablet, Place 1 tablet on the tongue Every 8 (Eight) Hours As Needed for Nausea or Vomiting., Disp: 30 tablet, Rfl: 2    prenatal vitamin (prenatal, CLASSIC, vitamin) tablet, Take  by mouth Daily., Disp: , Rfl:     Spacer/Aero-Holding Chambers (AeroChamber MV) inhaler, Use as instructed, Disp: 1 each, Rfl: 0    famotidine (Pepcid) 20 MG tablet, Take 1 tablet by mouth 2 (Two) Times a Day. (Patient not taking: Reported on 10/30/2024), Disp: 60 tablet, Rfl: 3         No follow-ups on file.      Part of this note may be an electronic transcription/translation of spoken language to printed text using the Dragon Dictation System.

## 2024-11-01 DIAGNOSIS — R82.71 GBS BACTERIURIA: Primary | ICD-10-CM

## 2024-11-01 LAB
AFP INTERP SERPL-IMP: NORMAL
AFP INTERP SERPL-IMP: NORMAL
AFP MOM SERPL: 1.69
AFP SERPL-MCNC: 84.1 NG/ML
AGE AT DELIVERY: 34.1 YR
BACTERIA UR CULT: ABNORMAL
BACTERIA UR CULT: ABNORMAL
GA METHOD: NORMAL
GA: 19.6 WEEKS
IDDM PATIENT QL: NO
LABORATORY COMMENT REPORT: NORMAL
MULTIPLE PREGNANCY: NO
NEURAL TUBE DEFECT RISK FETUS: 1671 %
RESULT: NORMAL

## 2024-11-01 RX ORDER — AMOXICILLIN 500 MG/1
500 CAPSULE ORAL 2 TIMES DAILY
Qty: 14 CAPSULE | Refills: 0 | Status: SHIPPED | OUTPATIENT
Start: 2024-11-01 | End: 2024-11-08

## 2024-11-13 ENCOUNTER — HOSPITAL ENCOUNTER (OUTPATIENT)
Dept: CARDIOLOGY | Facility: HOSPITAL | Age: 34
Discharge: HOME | End: 2024-11-13
Payer: COMMERCIAL

## 2024-11-13 VITALS
SYSTOLIC BLOOD PRESSURE: 118 MMHG | DIASTOLIC BLOOD PRESSURE: 74 MMHG | HEIGHT: 61 IN | WEIGHT: 165 LBS | HEART RATE: 84 BPM | BODY MASS INDEX: 31.15 KG/M2

## 2024-11-13 DIAGNOSIS — R00.2 PALPITATIONS: ICD-10-CM

## 2024-11-13 DIAGNOSIS — R06.09 DYSPNEA ON EXERTION: ICD-10-CM

## 2024-11-13 LAB
BH CV ECHO LEFT VENTRICLE GLOBAL LONGITUDINAL STRAIN: -18.4 %
BH CV ECHO MEAS - EDV(CUBED): 79.5 ML
BH CV ECHO MEAS - ESV(CUBED): 28.5 ML
BH CV ECHO MEAS - FS: 29 %
BH CV ECHO MEAS - IVS/LVPW: 1 CM
BH CV ECHO MEAS - IVSD: 0.8 CM
BH CV ECHO MEAS - LV MASS(C)D: 105.3 GRAMS
BH CV ECHO MEAS - LVIDD: 4.3 CM
BH CV ECHO MEAS - LVIDS: 3.1 CM
BH CV ECHO MEAS - LVPWD: 0.8 CM
BH CV ECHO MEAS - RAP SYSTOLE: 3 MMHG
BH CV ECHO MEAS - RVSP: 20.9 MMHG
BH CV ECHO MEAS - TR MAX PG: 17.9 MMHG
BH CV ECHO MEAS - TR MAX VEL: 211.7 CM/SEC

## 2024-11-13 PROCEDURE — 93356 MYOCRD STRAIN IMG SPCKL TRCK: CPT

## 2024-11-13 PROCEDURE — 93308 TTE F-UP OR LMTD: CPT

## 2024-11-13 PROCEDURE — 93242 EXT ECG>48HR<7D RECORDING: CPT

## 2024-11-13 PROCEDURE — 93321 DOPPLER ECHO F-UP/LMTD STD: CPT

## 2024-11-13 PROCEDURE — 93325 DOPPLER ECHO COLOR FLOW MAPG: CPT

## 2024-11-13 NOTE — PROGRESS NOTES
Please let patient know that her echo test is normal.  I do not see any findings on the echo that would suggest shortness of breath from a cardiac origin.  Her heart appears normal.  Thank you.

## 2024-11-26 LAB
CV ZIO BASELINE AVG BPM: 90 BPM
CV ZIO BASELINE BPM HIGH: 150 BPM
CV ZIO BASELINE BPM LOW: 64 BPM
CV ZIO DEVICE ANALYSIS TIME: NORMAL
CV ZIO ECT SVE COUNT: NORMAL EPISODES
CV ZIO ECT SVE CPLT COUNT: 92 EPISODES
CV ZIO ECT SVE CPLT FREQ: NORMAL
CV ZIO ECT SVE FREQ: NORMAL
CV ZIO ECT SVE TPLT COUNT: 0 EPISODES
CV ZIO ECT SVE TPLT FREQ: 0
CV ZIO ECT VE COUNT: 24 EPISODES
CV ZIO ECT VE CPLT COUNT: 0 EPISODES
CV ZIO ECT VE CPLT FREQ: 0
CV ZIO ECT VE FREQ: NORMAL
CV ZIO ECT VE TPLT COUNT: 0 EPISODES
CV ZIO ECT VE TPLT FREQ: 0
CV ZIO ECTOPIC SVE COUPLET RAW PERCENT: 0.03 %
CV ZIO ECTOPIC SVE ISOLATED PERCENT: 2.38 %
CV ZIO ECTOPIC SVE TRIPLET RAW PERCENT: 0 %
CV ZIO ECTOPIC VE COUPLET RAW PERCENT: 0 %
CV ZIO ECTOPIC VE ISOLATED PERCENT: 0 %
CV ZIO ECTOPIC VE TRIPLET RAW PERCENT: 0 %
CV ZIO ENROLLMENT END: NORMAL
CV ZIO ENROLLMENT START: NORMAL
CV ZIO PATIENT EVENTS DIARIES: 3
CV ZIO PATIENT EVENTS TRIGGERS: 17
CV ZIO PAUSE COUNT: 0
CV ZIO PRESCRIPTION STATUS: NORMAL
CV ZIO SVT COUNT: 0
CV ZIO TOTAL  ENROLLMENT PERIOD: NORMAL
CV ZIO VT COUNT: 0

## 2024-11-27 ENCOUNTER — ROUTINE PRENATAL (OUTPATIENT)
Dept: OBSTETRICS AND GYNECOLOGY | Facility: CLINIC | Age: 34
End: 2024-11-27
Payer: COMMERCIAL

## 2024-11-27 VITALS — DIASTOLIC BLOOD PRESSURE: 76 MMHG | BODY MASS INDEX: 31.37 KG/M2 | SYSTOLIC BLOOD PRESSURE: 112 MMHG | WEIGHT: 166 LBS

## 2024-11-27 DIAGNOSIS — Z86.79 HISTORY OF POSTPARTUM GESTATIONAL HYPERTENSION: ICD-10-CM

## 2024-11-27 DIAGNOSIS — Z87.59 HISTORY OF POSTPARTUM GESTATIONAL HYPERTENSION: ICD-10-CM

## 2024-11-27 DIAGNOSIS — J45.20 MILD INTERMITTENT ASTHMA WITHOUT COMPLICATION: ICD-10-CM

## 2024-11-27 DIAGNOSIS — Z36.9 ENCOUNTER FOR ANTENATAL SCREENING, UNSPECIFIED: Primary | ICD-10-CM

## 2024-11-27 DIAGNOSIS — Z23 NEED FOR VACCINATION: ICD-10-CM

## 2024-11-27 DIAGNOSIS — G43.111 MIGRAINE WITH AURA, WITH INTRACTABLE MIGRAINE, SO STATED, WITH STATUS MIGRAINOSUS: ICD-10-CM

## 2024-11-27 DIAGNOSIS — R82.71 GBS BACTERIURIA: ICD-10-CM

## 2024-11-27 PROBLEM — N93.9 ABNORMAL UTERINE BLEEDING (AUB): Status: RESOLVED | Noted: 2023-09-29 | Resolved: 2024-11-27

## 2024-11-27 LAB
BILIRUB BLD-MCNC: NEGATIVE MG/DL
CLARITY, POC: CLEAR
COLOR UR: YELLOW
GLUCOSE UR STRIP-MCNC: NEGATIVE MG/DL
KETONES UR QL: NEGATIVE
LEUKOCYTE EST, POC: NEGATIVE
NITRITE UR-MCNC: NEGATIVE MG/ML
PH UR: 5 [PH] (ref 5–8)
PROT UR STRIP-MCNC: NEGATIVE MG/DL
RBC # UR STRIP: NEGATIVE /UL
SP GR UR: 1.01 (ref 1–1.03)
UROBILINOGEN UR QL: NORMAL

## 2024-11-27 RX ORDER — ONDANSETRON 4 MG/1
4 TABLET, ORALLY DISINTEGRATING ORAL EVERY 8 HOURS PRN
Qty: 30 TABLET | Refills: 2 | Status: SHIPPED | OUTPATIENT
Start: 2024-11-27

## 2024-11-27 NOTE — PROGRESS NOTES
Chief Complaint   Patient presents with    Routine Prenatal Visit       HPI: 34 y.o.  at 23w4d here for f/u anatomy scan completion.  Declines vaccinations.     Relevant data reviewed:    Vitals:    24 0932   BP: 112/76   Weight: 75.3 kg (166 lb)     Total weight gain for pregnancy:  4.082 kg (9 lb)    Cx exam:   / /        Review of systems:   Gen: negative  CV:     negative  GI: negative  :   negative  MS:    negative  Neuro: negative  Pul: negative    Physical Exam  Vitals and nursing note reviewed.   Constitutional:       Appearance: She is well-developed.   HENT:      Head: Normocephalic and atraumatic.   Cardiovascular:      Rate and Rhythm: Normal rate.   Pulmonary:      Effort: Pulmonary effort is normal.   Abdominal:      General: There is no distension.      Palpations: Abdomen is soft. There is no mass.      Tenderness: There is no abdominal tenderness. There is no guarding.   Genitourinary:     Vagina: No vaginal discharge.   Musculoskeletal:         General: No tenderness or deformity. Normal range of motion.      Cervical back: Normal range of motion.   Skin:     General: Skin is warm and dry.      Coloration: Skin is not pale.      Findings: No erythema or rash.   Neurological:      Mental Status: She is alert and oriented to person, place, and time.   Psychiatric:         Behavior: Behavior normal.         Thought Content: Thought content normal.         Judgment: Judgment normal.             Results for orders placed in visit on 24    US OB Follow Up Transabdominal Approach    Narrative  IMP:  completed fetal anatomy. Wnl.  Ceph, EFW = 67%, posterior placenta, DVP = 6.64    Ronald Mccrary MD       A/P  1. Intrauterine pregnancy at 23w4d   2. Pregnancy Risk:  NORMAL    Diagnoses and all orders for this visit:    1. Encounter for  screening, unspecified (Primary)  -     POC Urinalysis Dipstick    2. Migraine with aura, with intractable migraine, so stated,  with status migrainosus    3. Mild intermittent asthma without complication  Overview:  Pt has inhaler prn      4. History of postpartum gestational hypertension    5. Need for vaccination  Comments:  HAS DECLINED ALL VACCINES  Overview:  HAS DECLINED ALL VACCINES      6. GBS bacteriuria- needs abx during labor    Other orders  -     ondansetron ODT (ZOFRAN-ODT) 4 MG disintegrating tablet; Place 1 tablet on the tongue Every 8 (Eight) Hours As Needed for Nausea or Vomiting.  Dispense: 30 tablet; Refill: 2        Practice OB call structure was discussed.   -----------------------  PLAN:   Return in about 4 weeks (around 12/25/2024) for ob tummy, GTT/HH, Tdap.      Ronald Mccrary MD  11/27/2024 10:08 EST

## 2024-12-23 ENCOUNTER — ROUTINE PRENATAL (OUTPATIENT)
Dept: OBSTETRICS AND GYNECOLOGY | Facility: CLINIC | Age: 34
End: 2024-12-23
Payer: COMMERCIAL

## 2024-12-23 VITALS — SYSTOLIC BLOOD PRESSURE: 110 MMHG | WEIGHT: 167.6 LBS | BODY MASS INDEX: 31.67 KG/M2 | DIASTOLIC BLOOD PRESSURE: 68 MMHG

## 2024-12-23 DIAGNOSIS — Z23 NEED FOR TDAP VACCINATION: Primary | ICD-10-CM

## 2024-12-23 DIAGNOSIS — Z36.9 ENCOUNTER FOR ANTENATAL SCREENING, UNSPECIFIED: ICD-10-CM

## 2024-12-23 DIAGNOSIS — Z23 NEED FOR VACCINATION: ICD-10-CM

## 2024-12-23 DIAGNOSIS — Z90.49 HX LAPAROSCOPIC CHOLECYSTECTOMY: ICD-10-CM

## 2024-12-23 DIAGNOSIS — N75.0 BARTHOLIN'S CYST: ICD-10-CM

## 2024-12-23 DIAGNOSIS — R82.71 GBS BACTERIURIA: ICD-10-CM

## 2024-12-23 DIAGNOSIS — Z87.59 HISTORY OF POSTPARTUM GESTATIONAL HYPERTENSION: ICD-10-CM

## 2024-12-23 DIAGNOSIS — Z86.79 HISTORY OF POSTPARTUM GESTATIONAL HYPERTENSION: ICD-10-CM

## 2024-12-23 DIAGNOSIS — J45.20 MILD INTERMITTENT ASTHMA WITHOUT COMPLICATION: ICD-10-CM

## 2024-12-23 PROBLEM — O23.40 URINARY TRACT INFECTION IN MOTHER DURING PREGNANCY, ANTEPARTUM: Status: RESOLVED | Noted: 2024-10-30 | Resolved: 2024-12-23

## 2024-12-23 PROBLEM — N94.6 DYSMENORRHEA: Status: RESOLVED | Noted: 2023-09-29 | Resolved: 2024-12-23

## 2024-12-23 NOTE — PROGRESS NOTES
"OB follow up < 20 weeks    Chief Complaint   Patient presents with    Routine Prenatal Visit       Shivani Hewitt is a 34 y.o.  27+ being seen today for her obstetrical visit.  Patient reports doing well; Declines Tdap . Taking +PNV.       Review of Systems    Genitourinary: Neg for cramping, vaginal bleeding, SROM, or dysuria.   Allergies   Allergen Reactions    Ketorolac Tromethamine Other (See Comments)     \"made pain worse\" per pt       /68   Wt 76 kg (167 lb 9.6 oz)   LMP 06/15/2024 (Approximate)   BMI 31.67 kg/m²     Vitals: VSS; AF    General Appearance:  Awake. Alert. Well developed. Well nourished. In no acute distress.    Visual Inspection: ° Abdomen was normal on visual inspection.  Palpation: ° Abdomen was soft. ° Abdominal non-tender.    Uterus: ° Fundal height was normal for gestational age. ° Not tender.  Uterine Adnexae: ° Normal without masses or tenderness.  Neurological:  ° Oriented to time, place, and person.  Skin:  ° General appearance was normal. No bruising or ecchymosis.  Obstetrical: +FM and FCA       Assessment    1) Pregnancy at 27+  2hr , H/H, RPR pending     2) CF/FX/SMA neg, MT21 neg; AFP low risk     3) N/V- Taking Zofran      4) Asthma-  Has UTD inhaler. Safe med list provided.      5)  Hx of GHTN- post delivery; was on Mag; Baseline CMP WNL, P/C xiesc=914; Taking baby asa daily     6) (L) bartholin's cyst- Enc warm compresses.      7)  H/O panic and PTSD- No counselor. NO current meds.  Reports she feels stable today.     8)  Racing heart- Reports -150s sometimes. Thyroid panel WNL (T3/T4) slightly low but says it's always \"slightly low\" per labs drawn at endocrinology in the past.  Repeat in 1 month.  Ref to cardiology; had appt today- reviewed note.  Previous work-up in  was normal.  ECG today was normal; scheduled for ECHO ( Normal)  and heart monitor WNL .   See Dr Lund Cardiology note     9) Tdap Received [id number]    10) GBS +   Tx in labor "     Plan    Continue prenatal vitamins   Reviewed this stage of pregnancy  Problem list updated   Follow up in 2 weeks for OB    Parts of this document have been copied or forwarded from her previous visits and have been reviewed, updated and edited as indicated.      I spent 30 minutes on this encounter, before, during and after the visit, evaluating the patient, reviewing records and writing orders.     Carter Bay, DO     12/23/2024  09:43 EST

## 2025-01-08 ENCOUNTER — ROUTINE PRENATAL (OUTPATIENT)
Dept: OBSTETRICS AND GYNECOLOGY | Facility: CLINIC | Age: 35
End: 2025-01-08
Payer: COMMERCIAL

## 2025-01-08 VITALS — WEIGHT: 170.2 LBS | DIASTOLIC BLOOD PRESSURE: 86 MMHG | SYSTOLIC BLOOD PRESSURE: 116 MMHG | BODY MASS INDEX: 32.16 KG/M2

## 2025-01-08 DIAGNOSIS — Z86.79 HISTORY OF POSTPARTUM GESTATIONAL HYPERTENSION: ICD-10-CM

## 2025-01-08 DIAGNOSIS — R21 RASH: ICD-10-CM

## 2025-01-08 DIAGNOSIS — R53.83 FATIGUE, UNSPECIFIED TYPE: ICD-10-CM

## 2025-01-08 DIAGNOSIS — Z34.93 PRENATAL CARE IN THIRD TRIMESTER: Primary | ICD-10-CM

## 2025-01-08 DIAGNOSIS — J45.909 ASTHMA, UNSPECIFIED ASTHMA SEVERITY, UNSPECIFIED WHETHER COMPLICATED, UNSPECIFIED WHETHER PERSISTENT: ICD-10-CM

## 2025-01-08 DIAGNOSIS — Z87.59 HISTORY OF POSTPARTUM GESTATIONAL HYPERTENSION: ICD-10-CM

## 2025-01-08 DIAGNOSIS — O21.9 NAUSEA AND VOMITING DURING PREGNANCY: ICD-10-CM

## 2025-01-08 DIAGNOSIS — R82.71 GBS BACTERIURIA: ICD-10-CM

## 2025-01-08 DIAGNOSIS — Z36.9 ENCOUNTER FOR ANTENATAL SCREENING, UNSPECIFIED: ICD-10-CM

## 2025-01-08 LAB
BILIRUB BLD-MCNC: NEGATIVE MG/DL
CLARITY, POC: CLEAR
COLOR UR: YELLOW
GLUCOSE UR STRIP-MCNC: NEGATIVE MG/DL
KETONES UR QL: ABNORMAL
LEUKOCYTE EST, POC: NEGATIVE
NITRITE UR-MCNC: NEGATIVE MG/ML
PH UR: 5 [PH] (ref 5–8)
PROT UR STRIP-MCNC: ABNORMAL MG/DL
RBC # UR STRIP: NEGATIVE /UL
SP GR UR: 1 (ref 1–1.03)
UROBILINOGEN UR QL: NORMAL

## 2025-01-08 RX ORDER — ONDANSETRON 4 MG/1
4 TABLET, ORALLY DISINTEGRATING ORAL EVERY 8 HOURS PRN
Qty: 30 TABLET | Refills: 2 | Status: SHIPPED | OUTPATIENT
Start: 2025-01-08

## 2025-01-08 RX ORDER — NYSTATIN AND TRIAMCINOLONE ACETONIDE 100000; 1 [USP'U]/G; MG/G
1 OINTMENT TOPICAL 2 TIMES DAILY
Qty: 60 G | Refills: 1 | Status: SHIPPED | OUTPATIENT
Start: 2025-01-08 | End: 2025-01-15

## 2025-01-08 NOTE — PROGRESS NOTES
"OB follow up > 20 weeks    Chief Complaint   Patient presents with    Routine Prenatal Visit       Shivani Hewitt is a 34 y.o.  29w4d being seen today for her obstetrical visit.  Patient reports  fatigue, nausea, occ vomiting, headache, blurred vision, some swelling, rash  . Taking prenatal vitamins: Yes and baby asa.       Review of Systems  Genitourinary: Negative for contractions, cramping, vaginal bleeding, or SROM.   Fetal movement: normal  Allergies   Allergen Reactions    Ketorolac Tromethamine Other (See Comments)     \"made pain worse\" per pt        /86   Wt 77.2 kg (170 lb 3.2 oz)   LMP 06/15/2024 (Approximate)   BMI 32.16 kg/m²     FHT: 145 BPM   Uterine Size: 27 cm       Assessment    1) pregnancy at 29w4d- passed GTT    2) CF/FX/SMA neg, MT21 neg; AFP low risk      3) N/V- has returned; ERX Zofran PRN; 3+ ketones/protein in urine      4) Asthma-  Has UTD inhaler. Safe med list provided.      5)  Hx of GHTN- post delivery; was on Mag; Baseline CMP WNL, P/C cndib=174; Taking baby asa daily. BP slightly elevated today with 3+ protein in urine.  Headache present with visual changes.  Check CMP with P/C ratio     6)  H/O panic and PTSD- No counselor. NO current meds.  Reports she feels stable today.     7)  Racing heart- Reports -150s sometimes. Thyroid panel WNL (T3/T4) slightly low but says it's always \"slightly low\" per labs drawn at endocrinology in the past.  Repeat in 1 month.  Ref to cardiology; had appt today- reviewed note.  Previous work-up in  was normal.  ECG and ECHO (Normal) and heart monitor WNL.   See Dr Lund Cardiology note      8) Tdap Received [id number]    9) declined Flu vaccine     10) GBS+ - check urine cx/SHONDA today  Tx in labor     11) rash- on legs; red and raised with occasional satellite lesions; thinks it may be excema; applied cream and hydrogen peroxide with some relief; ERX Nystatin with Triamcinolone    12) S<D- check growth next OB visit    13) fatigue- " check TSH    Plan    Continue prenatal vitamins  Reviewed this stage of pregnancy  Problem list updated   Follow up in 2 weeks for OBT, US for growth    Parts of this document have been copied or forwarded from her previous visits and have been reviewed, updated and edited as indicated.      Araceli Starkey, APRN  1/8/2025  16:18 EST

## 2025-01-09 LAB
ALBUMIN SERPL-MCNC: 3.5 G/DL (ref 3.9–4.9)
ALP SERPL-CCNC: 137 IU/L (ref 44–121)
ALT SERPL-CCNC: 22 IU/L (ref 0–32)
AST SERPL-CCNC: 33 IU/L (ref 0–40)
BILIRUB SERPL-MCNC: 0.2 MG/DL (ref 0–1.2)
BUN SERPL-MCNC: 8 MG/DL (ref 6–20)
BUN/CREAT SERPL: 12 (ref 9–23)
CALCIUM SERPL-MCNC: 8.6 MG/DL (ref 8.7–10.2)
CHLORIDE SERPL-SCNC: 105 MMOL/L (ref 96–106)
CO2 SERPL-SCNC: 17 MMOL/L (ref 20–29)
CREAT SERPL-MCNC: 0.65 MG/DL (ref 0.57–1)
EGFRCR SERPLBLD CKD-EPI 2021: 119 ML/MIN/1.73
GLOBULIN SER CALC-MCNC: 2.9 G/DL (ref 1.5–4.5)
GLUCOSE SERPL-MCNC: 89 MG/DL (ref 70–99)
POTASSIUM SERPL-SCNC: 4.1 MMOL/L (ref 3.5–5.2)
PROT SERPL-MCNC: 6.4 G/DL (ref 6–8.5)
SODIUM SERPL-SCNC: 138 MMOL/L (ref 134–144)
TSH SERPL DL<=0.005 MIU/L-ACNC: 1.41 UIU/ML (ref 0.45–4.5)

## 2025-01-11 LAB
BACTERIA UR CULT: NORMAL
BACTERIA UR CULT: NORMAL
CREAT UR-MCNC: 70.3 MG/DL
PROT UR-MCNC: 8.4 MG/DL
PROT/CREAT UR: 119 MG/G CREAT (ref 0–200)

## 2025-01-23 ENCOUNTER — ROUTINE PRENATAL (OUTPATIENT)
Dept: OBSTETRICS AND GYNECOLOGY | Facility: CLINIC | Age: 35
End: 2025-01-23
Payer: COMMERCIAL

## 2025-01-23 VITALS — DIASTOLIC BLOOD PRESSURE: 70 MMHG | WEIGHT: 173 LBS | SYSTOLIC BLOOD PRESSURE: 112 MMHG | BODY MASS INDEX: 32.69 KG/M2

## 2025-01-23 DIAGNOSIS — Z90.49 HX LAPAROSCOPIC CHOLECYSTECTOMY: ICD-10-CM

## 2025-01-23 DIAGNOSIS — Z86.79 HISTORY OF POSTPARTUM GESTATIONAL HYPERTENSION: ICD-10-CM

## 2025-01-23 DIAGNOSIS — R87.612 LGSIL ON PAP SMEAR OF CERVIX: ICD-10-CM

## 2025-01-23 DIAGNOSIS — R82.71 GBS BACTERIURIA: ICD-10-CM

## 2025-01-23 DIAGNOSIS — Z36.9 ENCOUNTER FOR ANTENATAL SCREENING, UNSPECIFIED: Primary | ICD-10-CM

## 2025-01-23 DIAGNOSIS — Z87.59 HISTORY OF POSTPARTUM GESTATIONAL HYPERTENSION: ICD-10-CM

## 2025-01-23 PROBLEM — N75.0 BARTHOLIN'S CYST: Status: RESOLVED | Noted: 2021-03-30 | Resolved: 2025-01-23

## 2025-01-23 LAB
BILIRUB BLD-MCNC: NEGATIVE MG/DL
CLARITY, POC: CLEAR
COLOR UR: YELLOW
DEPRECATED FRAXE GENE CGG RPT BLD/T QL: NORMAL
GLUCOSE UR STRIP-MCNC: NEGATIVE MG/DL
KETONES UR QL: NEGATIVE
LEUKOCYTE EST, POC: NEGATIVE
NITRITE UR-MCNC: NEGATIVE MG/ML
NTRA CYSTIC FIBROSIS: NORMAL
NTRA SPINAL MUSCULAR ATROPHY: NORMAL
PH UR: 5 [PH] (ref 5–8)
PROT UR STRIP-MCNC: NEGATIVE MG/DL
RBC # UR STRIP: NEGATIVE /UL
SP GR UR: 1 (ref 1–1.03)
UROBILINOGEN UR QL: NORMAL

## 2025-01-23 NOTE — PROGRESS NOTES
"OB follow up > 20 weeks    Chief Complaint   Patient presents with    Routine Prenatal Visit       Shivani Hewitt is a 35 y.o.  31+5 being seen today for her obstetrical visit.  Patient reports  had her US today  . Taking prenatal vitamins: Yes and baby asa.       Review of Systems  Genitourinary: Negative for contractions, cramping, vaginal bleeding, or SROM.   Fetal movement: normal  Allergies   Allergen Reactions    Ketorolac Tromethamine Other (See Comments)     \"made pain worse\" per pt        /70   Wt 78.5 kg (173 lb)   LMP 06/15/2024 (Approximate)   BMI 32.69 kg/m²     Vitals: VSS; AF    General Appearance:  Awake. Alert. Well developed. Well nourished. In no acute distress.    Visual Inspection: ° Abdomen was normal on visual inspection.  Palpation: ° Abdomen was soft. ° Abdominal non-tender.    Uterus: ° Fundal height was normal for gestational age. ° Not tender.  Uterine Adnexae: ° Normal without masses or tenderness.  Neurological:  ° Oriented to time, place, and person.  Skin:  ° General appearance was normal. No bruising or ecchymosis.  Obstetrical: +FM and FCA     Presentation: VTX  Placenta: Posterior  RAFAEL: 11.4 cm   FCA: 140's    EFW  2298g 5.1# 77.8%         Ultrasound images and report reviewed personally by me.          Carter Bay, DO     Assessment    1) pregnancy at 31+    2) CF/FX/SMA neg, MT21 neg; AFP low risk      3) N/V- has returned; ERX Zofran PRN; 3+ ketones/protein in urine      4) Asthma-  Has UTD inhaler. Safe med list provided.      5)  Hx of GHTN- post delivery; was on Mag; Baseline CMP WNL, P/C vnxuf=817; Taking baby asa daily.   Pr:Cr 119 8Jan25 labs reassuring     Growth today EFW  2298g 5.1# 77.8%      6)  H/O panic and PTSD- No counselor. NO current meds.  Reports she feels stable today.     7)  Racing heart- Reports -150s sometimes. Thyroid panel WNL (T3/T4) slightly low but says it's always \"slightly low\" per labs drawn at endocrinology in the " past.  Repeat in 1 month.  Ref to cardiology; had appt today- reviewed note.  Previous work-up in 202 was normal.  ECG and ECHO (Normal) and heart monitor WNL.   See Dr Lund Cardiology note      8) Tdap Received [id number], RSV Vaccine ( ) 32-36wga     9) declined Flu vaccine     10) GBS+ - check urine cx/SHONDA today  Tx in labor     11) Nexplanon pp ' Spouse plans vasectomy ?         Plan    Continue prenatal vitamins  Reviewed this stage of pregnancy  Problem list updated   Follow up in 2 weeks for OB    Parts of this document have been copied or forwarded from her previous visits and have been reviewed, updated and edited as indicated.      Carter Bay, DO  1/23/2025  15:25 EST

## 2025-02-05 ENCOUNTER — ROUTINE PRENATAL (OUTPATIENT)
Dept: OBSTETRICS AND GYNECOLOGY | Facility: CLINIC | Age: 35
End: 2025-02-05
Payer: COMMERCIAL

## 2025-02-05 VITALS — BODY MASS INDEX: 32.88 KG/M2 | SYSTOLIC BLOOD PRESSURE: 114 MMHG | DIASTOLIC BLOOD PRESSURE: 86 MMHG | WEIGHT: 174 LBS

## 2025-02-05 DIAGNOSIS — R82.71 GBS BACTERIURIA: ICD-10-CM

## 2025-02-05 DIAGNOSIS — Z34.93 PRENATAL CARE IN THIRD TRIMESTER: Primary | ICD-10-CM

## 2025-02-05 DIAGNOSIS — Z36.9 ENCOUNTER FOR ANTENATAL SCREENING, UNSPECIFIED: ICD-10-CM

## 2025-02-05 LAB
BILIRUB BLD-MCNC: NEGATIVE MG/DL
CLARITY, POC: CLEAR
COLOR UR: YELLOW
ERYTHROCYTE [DISTWIDTH] IN BLOOD BY AUTOMATED COUNT: 12.8 % (ref 12.3–15.4)
GLUCOSE UR STRIP-MCNC: NEGATIVE MG/DL
HCT VFR BLD AUTO: 38.8 % (ref 34–46.6)
HGB BLD-MCNC: 13.5 G/DL (ref 12–15.9)
KETONES UR QL: ABNORMAL
LEUKOCYTE EST, POC: NEGATIVE
MCH RBC QN AUTO: 31.3 PG (ref 26.6–33)
MCHC RBC AUTO-ENTMCNC: 34.8 G/DL (ref 31.5–35.7)
MCV RBC AUTO: 89.8 FL (ref 79–97)
NITRITE UR-MCNC: NEGATIVE MG/ML
PH UR: 5 [PH] (ref 5–8)
PLATELET # BLD AUTO: 206 10*3/MM3 (ref 140–450)
PROT UR STRIP-MCNC: NEGATIVE MG/DL
RBC # BLD AUTO: 4.32 10*6/MM3 (ref 3.77–5.28)
RBC # UR STRIP: NEGATIVE /UL
SP GR UR: 1 (ref 1–1.03)
UROBILINOGEN UR QL: NORMAL
WBC # BLD AUTO: 9.84 10*3/MM3 (ref 3.4–10.8)

## 2025-02-05 NOTE — PROGRESS NOTES
"OB follow up > 20 weeks    Chief Complaint   Patient presents with    Routine Prenatal Visit       Shivani Hewitt is a 35 y.o.  33w4d being seen today for her obstetrical visit.  She c/o fatigue today. She reports good fetal movement. Taking PNV and ASA.       Review of Systems  Genitourinary: Negative for contractions, cramping, vaginal bleeding, or SROM.   Fetal movement: normal  Allergies   Allergen Reactions    Ketorolac Tromethamine Other (See Comments)     \"made pain worse\" per pt        /86   Wt 78.9 kg (174 lb)   LMP 06/15/2024 (Approximate)   BMI 32.88 kg/m²     FHT: 140s BPM   Uterine Size: 33cm        Assessment    1) pregnancy at 33w4d-     2) CF/FX/SMA neg, MT21 neg; AFP low risk      3) N/V- has returned; ERX Zofran PRN;      4) Asthma-  Has UTD inhaler. Safe med list provided.      5)  Hx of GHTN- post delivery; was on Mag; Baseline CMP WNL, P/C xshpo=868; Taking baby asa daily.   P/C ratio 119 on 25  CMP normal      6)  H/O panic and PTSD- No counselor. NO current meds.  Reports she feels stable today.     7)  Racing heart- TSH normal. Hgb was not checked recently. S/P cardiology consult.- Normal EKG and Echo. Check CBC today.   See Dr Lund Cardiology note      8) Tdap Received [id number]    9) declined Flu vaccine     10) GBS bacteruria- Needs antibx  in labor     11) Growth 77% @ 31 weeks     Plan    Continue prenatal vitamins  Reviewed this stage of pregnancy  Problem list updated   Follow up in 2 weeks for OBT     Parts of this document have been copied or forwarded from her previous visits and have been reviewed, updated and edited as indicated.      Lauren De La Fuente, APRN  2025  13:05 EST   "

## 2025-02-19 ENCOUNTER — TELEPHONE (OUTPATIENT)
Dept: OBSTETRICS AND GYNECOLOGY | Facility: CLINIC | Age: 35
End: 2025-02-19

## 2025-02-19 ENCOUNTER — ROUTINE PRENATAL (OUTPATIENT)
Dept: OBSTETRICS AND GYNECOLOGY | Facility: CLINIC | Age: 35
End: 2025-02-19
Payer: COMMERCIAL

## 2025-02-19 VITALS — BODY MASS INDEX: 33.25 KG/M2 | DIASTOLIC BLOOD PRESSURE: 70 MMHG | WEIGHT: 176 LBS | SYSTOLIC BLOOD PRESSURE: 122 MMHG

## 2025-02-19 DIAGNOSIS — Z30.2 REQUEST FOR STERILIZATION: ICD-10-CM

## 2025-02-19 DIAGNOSIS — Z34.93 PRENATAL CARE IN THIRD TRIMESTER: Primary | ICD-10-CM

## 2025-02-19 DIAGNOSIS — Z86.79 HISTORY OF POSTPARTUM GESTATIONAL HYPERTENSION: ICD-10-CM

## 2025-02-19 DIAGNOSIS — R82.71 GBS BACTERIURIA: ICD-10-CM

## 2025-02-19 DIAGNOSIS — Z87.59 HISTORY OF POSTPARTUM GESTATIONAL HYPERTENSION: ICD-10-CM

## 2025-02-19 DIAGNOSIS — Z36.9 ENCOUNTER FOR ANTENATAL SCREENING, UNSPECIFIED: ICD-10-CM

## 2025-02-19 NOTE — TELEPHONE ENCOUNTER
Hub staff attempted to follow warm transfer process and was unsuccessful     Caller: Shivani Hewitt    Relationship to patient: Self    Best call back number: [phone]    Patient is needing: PT WOULD LIKE TO KNOW IF THERE ARE ANY EARLIER APPTS AVAILABLE FOR TODAY

## 2025-02-19 NOTE — PROGRESS NOTES
"OB follow up > 20 weeks    Chief Complaint   Patient presents with    Routine Prenatal Visit       Shivani Hewitt is a 35 y.o.  35w4d being seen today for her obstetrical visit.  Patient reports no complaints. Taking prenatal vitamins: Yes        Review of Systems  Genitourinary: Negative for contractions, cramping, vaginal bleeding, or SROM.   Fetal movement: normal  Allergies   Allergen Reactions    Ketorolac Tromethamine Other (See Comments)     \"made pain worse\" per pt        /70   Wt 79.8 kg (176 lb)   LMP 06/15/2024 (Approximate)   BMI 33.25 kg/m²     FHT: 138 BPM   Uterine Size: 35 cm       Assessment    1) pregnancy at 35w4d     2) CF/FX/SMA neg, MT21 neg; AFP low risk      3) N/V- has returned; has Zofran PRN;      4) Asthma-  Has UTD inhaler. Safe med list provided.      5)  Hx of GHTN- post delivery; was on Mag; Baseline CMP WNL, P/C cenaf=156; Taking baby asa daily.   P/C ratio 119 on 25  CMP normal       6)  H/O panic and PTSD- No counselor. NO current meds.  Reports she feels stable today.     7)  Racing heart- TSH normal. Hgb 13.5g/dl.  S/P cardiology consult.- Normal EKG and Echo.    See Dr Lund Cardiology note      8) Tdap Received [id number]     9) declined Flu vaccine     10) GBS bacteruria- Needs antibx  in labor      11) Growth 77% @ 31 weeks     12) contraception- interested in tubal vs Nexplanon       Plan    Continue prenatal vitamins  Reviewed this stage of pregnancy  Problem list updated   Follow up in 1 weeks for OBI; discuss tubal    Parts of this document have been copied or forwarded from her previous visits and have been reviewed, updated and edited as indicated.      Araclei Starkey, APRN  2025  11:15 EST   "

## 2025-02-27 ENCOUNTER — ROUTINE PRENATAL (OUTPATIENT)
Dept: OBSTETRICS AND GYNECOLOGY | Facility: CLINIC | Age: 35
End: 2025-02-27
Payer: COMMERCIAL

## 2025-02-27 VITALS — DIASTOLIC BLOOD PRESSURE: 76 MMHG | BODY MASS INDEX: 33.82 KG/M2 | SYSTOLIC BLOOD PRESSURE: 118 MMHG | WEIGHT: 179 LBS

## 2025-02-27 DIAGNOSIS — J45.20 MILD INTERMITTENT ASTHMA WITHOUT COMPLICATION: ICD-10-CM

## 2025-02-27 DIAGNOSIS — Z87.59 HISTORY OF POSTPARTUM GESTATIONAL HYPERTENSION: ICD-10-CM

## 2025-02-27 DIAGNOSIS — Z36.9 ENCOUNTER FOR ANTENATAL SCREENING, UNSPECIFIED: Primary | ICD-10-CM

## 2025-02-27 DIAGNOSIS — R82.71 GBS BACTERIURIA: ICD-10-CM

## 2025-02-27 DIAGNOSIS — Z30.2 REQUEST FOR STERILIZATION: ICD-10-CM

## 2025-02-27 DIAGNOSIS — Z86.79 HISTORY OF POSTPARTUM GESTATIONAL HYPERTENSION: ICD-10-CM

## 2025-02-27 PROBLEM — Z23 NEED FOR VACCINATION: Status: RESOLVED | Noted: 2024-11-27 | Resolved: 2025-02-27

## 2025-02-27 LAB
BILIRUB BLD-MCNC: NEGATIVE MG/DL
CLARITY, POC: CLEAR
COLOR UR: YELLOW
GLUCOSE UR STRIP-MCNC: NEGATIVE MG/DL
KETONES UR QL: NEGATIVE
LEUKOCYTE EST, POC: NEGATIVE
NITRITE UR-MCNC: NEGATIVE MG/ML
PH UR: 5 [PH] (ref 5–8)
PROT UR STRIP-MCNC: NEGATIVE MG/DL
RBC # UR STRIP: NEGATIVE /UL
SP GR UR: 1.03 (ref 1–1.03)
UROBILINOGEN UR QL: NORMAL

## 2025-02-27 NOTE — PROGRESS NOTES
"OB follow up > 20 weeks    Chief Complaint   Patient presents with    Routine Prenatal Visit       Shivani Hewitt is a 35 y.o.  36+ being seen today for her obstetrical visit.  Patient reports no complaints. Taking prenatal vitamins: Yes. Desires SVE       Review of Systems  Genitourinary: Negative for contractions, cramping, vaginal bleeding, or SROM.   Fetal movement: normal  Allergies   Allergen Reactions    Ketorolac Tromethamine Other (See Comments)     \"made pain worse\" per pt        /76   Wt 81.2 kg (179 lb)   LMP 06/15/2024 (Approximate)   BMI 33.82 kg/m²     Vitals: VSS; AF    General Appearance:  Awake. Alert. Well developed. Well nourished. In no acute distress.    Visual Inspection: ° Abdomen was normal on visual inspection.  Palpation: ° Abdomen was soft. ° Abdominal non-tender.    Uterus: ° Fundal height was normal for gestational age. ° Not tender.  Uterine Adnexae: ° Normal without masses or tenderness.  Neurological:  ° Oriented to time, place, and person.  Skin:  ° General appearance was normal. No bruising or ecchymosis.  Obstetrical: +FM and FCA     SVE /-3    Assessment    1) pregnancy at 36+  GBS + via urine : Tx in labor      Discussed IOL; declines at this time 39-40wga     2) CF/FX/SMA neg, MT21 neg; AFP low risk      3) Asthma-  Has UTD inhaler. Safe med list provided.      4)  Hx of GHTN- post delivery; was on Mag; Baseline CMP WNL, P/C mnpnw=361; Taking baby asa daily.  Stop this week   P/C ratio 119 on 25  CMP normal       5)  H/O panic and PTSD- Stable      6)  Racing heart- TSH normal. Hgb 13.5g/dl.  S/P cardiology consult.- Normal EKG and Echo.    See Dr Lund Cardiology note      7) Tdap Received [id number]   Declined Flu vaccine     8) GBS bacteruria- Needs antibx  in labor     9) contraception- interested in tubal vs Nexplanon  Discussed today desires interval salpingectomy w/Dr Bay ( [id number])     Sterilization Consult  We discussed all other forms of " contraception including pills, patch, vaginal ring, injection, implant, IUD, and vasectomy. We discussed the forms of permanent sterilization including  laparoscopic tubal occlusion/partial salpingectomy, and laparoscopic salpingectomy.  We discussed a minimal increased risk of bleeding with the salpingectomy as well as the benefits including ovarian cancer risk reduction and reduced risk of ectopic pregnancy.   We discussed a risk of regret of up to 40% in women <30 years of age.  I asked her to consider if her desires would change if something happened to her current children, if she were to divorce, or if her spouse were to die unexpectedly.  We discussed that this is a permanent procedure and that she would need in vitro fertilization at the cost of up to $20,000 per cycle if she desired a pregnancy after this procedure.    We discussed that risks of surgery also include bleeding, possible need for a transfusion (with associated risks of HIV, hepatitis, and other infectious diseases), infection requiring prolonged hospitalization and treatment with antibiotics, damage to other structures (bowel, bladder, ureters, blood vessels) requiring further surgery necessitating a larger incision to remove or repair affected organs with subsequent poor wound healing, and persistent pain.    She was also counseled that anesthesia carries its own risks and that any major surgery carries the rare risk of blood clots, pulmonary embolism, stroke, heart attack, or death. All of the patient's questions were answered to her satisfaction and she desires to proceed with surgery.      Sign sterilization paperwork today ( [id number])     Plan    Continue prenatal vitamins  Reviewed this stage of pregnancy  Problem list updated   Follow up in 1 weeks for OB, SVE     Parts of this document have been copied or forwarded from her previous visits and have been reviewed, updated and edited as indicated.      Carter Bay,  DO  2/27/2025  16:20 EST

## 2025-03-06 ENCOUNTER — ROUTINE PRENATAL (OUTPATIENT)
Dept: OBSTETRICS AND GYNECOLOGY | Facility: CLINIC | Age: 35
End: 2025-03-06
Payer: COMMERCIAL

## 2025-03-06 VITALS — SYSTOLIC BLOOD PRESSURE: 120 MMHG | BODY MASS INDEX: 34.39 KG/M2 | DIASTOLIC BLOOD PRESSURE: 72 MMHG | WEIGHT: 182 LBS

## 2025-03-06 DIAGNOSIS — R12 HEARTBURN: ICD-10-CM

## 2025-03-06 DIAGNOSIS — Z3A.37 37 WEEKS GESTATION OF PREGNANCY: Primary | ICD-10-CM

## 2025-03-06 DIAGNOSIS — G43.111 MIGRAINE WITH AURA, WITH INTRACTABLE MIGRAINE, SO STATED, WITH STATUS MIGRAINOSUS: ICD-10-CM

## 2025-03-06 DIAGNOSIS — Z30.2 REQUEST FOR STERILIZATION: ICD-10-CM

## 2025-03-06 DIAGNOSIS — Z36.9 ENCOUNTER FOR ANTENATAL SCREENING, UNSPECIFIED: ICD-10-CM

## 2025-03-06 DIAGNOSIS — L29.9 ITCHING: ICD-10-CM

## 2025-03-06 DIAGNOSIS — R82.71 GBS BACTERIURIA: ICD-10-CM

## 2025-03-06 DIAGNOSIS — Z90.49 HX LAPAROSCOPIC CHOLECYSTECTOMY: ICD-10-CM

## 2025-03-06 DIAGNOSIS — R87.612 LGSIL ON PAP SMEAR OF CERVIX: ICD-10-CM

## 2025-03-06 LAB
BILIRUB BLD-MCNC: NEGATIVE MG/DL
CLARITY, POC: CLEAR
COLOR UR: YELLOW
GLUCOSE UR STRIP-MCNC: NEGATIVE MG/DL
KETONES UR QL: NEGATIVE
LEUKOCYTE EST, POC: NEGATIVE
NITRITE UR-MCNC: NEGATIVE MG/ML
PH UR: 6 [PH] (ref 5–8)
PROT UR STRIP-MCNC: NEGATIVE MG/DL
RBC # UR STRIP: NEGATIVE /UL
SP GR UR: 1.02 (ref 1–1.03)
UROBILINOGEN UR QL: NORMAL

## 2025-03-06 RX ORDER — URSODIOL 300 MG/1
300 CAPSULE ORAL 2 TIMES DAILY
Qty: 40 CAPSULE | Refills: 0 | Status: SHIPPED | OUTPATIENT
Start: 2025-03-06

## 2025-03-06 RX ORDER — FAMOTIDINE 20 MG/1
20 TABLET, FILM COATED ORAL DAILY
Qty: 30 TABLET | Refills: 1 | Status: SHIPPED | OUTPATIENT
Start: 2025-03-06 | End: 2026-03-06

## 2025-03-06 NOTE — PROGRESS NOTES
Chief Complaint   Patient presents with    Routine Prenatal Visit       HPI: 35 y.o.  at 37w5d presenting for an OB visit. Overall feeling well today.  Has been having more frequent episodes of itching on her hands, soles, and neck.  Occasional headaches and nausea and shortness of breath.  Having to sleep with extra pillows at night.  Says she is snoring more often though, which was not an issue before pregnancy.  She denies any fevers, chills, headaches, blurry vision, chest pain, shortness of breath abdominal pain, dysuria, or leg swelling.  She denies any vaginal bleeding, leakage of fluid, contractions, change in fetal movement, or increased vaginal pressure.    Relevant data reviewed:    Last OB US Data (since 2024)       None          Vitals:    25 1601   BP: 120/72   Weight: 82.6 kg (182 lb)     Total weight gain for pregnancy:  11.3 kg (25 lb)    Cx exam:  503       Review of systems:   Gen: negative  CV:     negative  GI: negative  :   negative  MS:    negative  Neuro: negative  Pul: negative    Physical Exam  Constitutional:       General: She is not in acute distress.     Appearance: She is not ill-appearing.   Eyes:      Pupils: Pupils are equal, round, and reactive to light.   Pulmonary:      Effort: Pulmonary effort is normal. No respiratory distress.   Abdominal:      General: There is no distension.      Palpations: Abdomen is soft.      Tenderness: There is no abdominal tenderness.   Musculoskeletal:         General: Normal range of motion.   Neurological:      General: No focal deficit present.      Mental Status: She is alert and oriented to person, place, and time.   Psychiatric:         Mood and Affect: Mood normal.         Behavior: Behavior normal.         A/P  1. Intrauterine pregnancy at 37w5d   -Negative protein on urine dip and normal blood pressure, low concern for preeclampsia but will complete a laboratory evaluation.  More concern for cholestasis, empiric  treatment with Actigall for symptomatic control as well as total bile acids to be completed as well.  2. Pregnancy Risk:  NORMAL    Diagnoses and all orders for this visit:    1. 37 weeks gestation of pregnancy (Primary)    2. Hx laparoscopic cholecystectomy    3. GBS bacteriuria- needs abx during labor    4. LGSIL on Pap smear of cervix: , NEG     5. Request for sterilization    6. Migraine with aura, with intractable migraine, so stated, with status migrainosus    7. Encounter for  screening, unspecified  -     POC Urinalysis Dipstick    8. Itching  -     Bile Acids, Total  -     Comprehensive Metabolic Panel  -     CBC & Differential  -     ursodiol (ACTIGALL) 300 MG capsule; Take 1 capsule by mouth 2 (Two) Times a Day.  Dispense: 40 capsule; Refill: 0    9. Heartburn  -     famotidine (Pepcid) 20 MG tablet; Take 1 tablet by mouth Daily.  Dispense: 30 tablet; Refill: 1        Routine labor warnings were discussed and indications for L & D f/u including bleeding, regular contractions, decreased fetal movement or/and rupture of membranes.   -----------------------  PLAN:   Return in about 1 week (around 3/13/2025) for OB visit.    Bryson Norris MD  3/6/2025   16:28 EST

## 2025-03-09 LAB
ALBUMIN SERPL-MCNC: 3.4 G/DL (ref 3.9–4.9)
ALP SERPL-CCNC: 229 IU/L (ref 44–121)
ALT SERPL-CCNC: 24 IU/L (ref 0–32)
AST SERPL-CCNC: 46 IU/L (ref 0–40)
BASOPHILS # BLD AUTO: 0 X10E3/UL (ref 0–0.2)
BASOPHILS NFR BLD AUTO: 0 %
BILE AC SERPL-SCNC: 6 UMOL/L (ref 0–10)
BILIRUB SERPL-MCNC: 0.2 MG/DL (ref 0–1.2)
BUN SERPL-MCNC: 16 MG/DL (ref 6–20)
BUN/CREAT SERPL: 17 (ref 9–23)
CALCIUM SERPL-MCNC: 9.1 MG/DL (ref 8.7–10.2)
CHLORIDE SERPL-SCNC: 104 MMOL/L (ref 96–106)
CO2 SERPL-SCNC: 18 MMOL/L (ref 20–29)
CREAT SERPL-MCNC: 0.96 MG/DL (ref 0.57–1)
EGFRCR SERPLBLD CKD-EPI 2021: 80 ML/MIN/1.73
EOSINOPHIL # BLD AUTO: 0 X10E3/UL (ref 0–0.4)
EOSINOPHIL NFR BLD AUTO: 1 %
ERYTHROCYTE [DISTWIDTH] IN BLOOD BY AUTOMATED COUNT: 13.8 % (ref 11.7–15.4)
GLOBULIN SER CALC-MCNC: 2.6 G/DL (ref 1.5–4.5)
GLUCOSE SERPL-MCNC: 94 MG/DL (ref 70–99)
HCT VFR BLD AUTO: 40.6 % (ref 34–46.6)
HGB BLD-MCNC: 13.9 G/DL (ref 11.1–15.9)
IMM GRANULOCYTES # BLD AUTO: 0.1 X10E3/UL (ref 0–0.1)
IMM GRANULOCYTES NFR BLD AUTO: 2 %
LYMPHOCYTES # BLD AUTO: 1.4 X10E3/UL (ref 0.7–3.1)
LYMPHOCYTES NFR BLD AUTO: 19 %
MCH RBC QN AUTO: 31 PG (ref 26.6–33)
MCHC RBC AUTO-ENTMCNC: 34.2 G/DL (ref 31.5–35.7)
MCV RBC AUTO: 90 FL (ref 79–97)
MONOCYTES # BLD AUTO: 0.6 X10E3/UL (ref 0.1–0.9)
MONOCYTES NFR BLD AUTO: 7 %
NEUTROPHILS # BLD AUTO: 5.5 X10E3/UL (ref 1.4–7)
NEUTROPHILS NFR BLD AUTO: 71 %
PLATELET # BLD AUTO: 170 X10E3/UL (ref 150–450)
POTASSIUM SERPL-SCNC: 4.1 MMOL/L (ref 3.5–5.2)
PROT SERPL-MCNC: 6 G/DL (ref 6–8.5)
RBC # BLD AUTO: 4.49 X10E6/UL (ref 3.77–5.28)
SODIUM SERPL-SCNC: 136 MMOL/L (ref 134–144)
WBC # BLD AUTO: 7.7 X10E3/UL (ref 3.4–10.8)

## 2025-03-13 ENCOUNTER — ROUTINE PRENATAL (OUTPATIENT)
Dept: OBSTETRICS AND GYNECOLOGY | Facility: CLINIC | Age: 35
End: 2025-03-13
Payer: COMMERCIAL

## 2025-03-13 VITALS — BODY MASS INDEX: 35.03 KG/M2 | SYSTOLIC BLOOD PRESSURE: 118 MMHG | WEIGHT: 185.4 LBS | DIASTOLIC BLOOD PRESSURE: 86 MMHG

## 2025-03-13 DIAGNOSIS — Z30.2 REQUEST FOR STERILIZATION: ICD-10-CM

## 2025-03-13 DIAGNOSIS — Z87.59 HISTORY OF POSTPARTUM GESTATIONAL HYPERTENSION: ICD-10-CM

## 2025-03-13 DIAGNOSIS — G43.111 MIGRAINE WITH AURA, WITH INTRACTABLE MIGRAINE, SO STATED, WITH STATUS MIGRAINOSUS: ICD-10-CM

## 2025-03-13 DIAGNOSIS — Z86.79 HISTORY OF POSTPARTUM GESTATIONAL HYPERTENSION: ICD-10-CM

## 2025-03-13 DIAGNOSIS — Z36.9 ENCOUNTER FOR ANTENATAL SCREENING, UNSPECIFIED: ICD-10-CM

## 2025-03-13 DIAGNOSIS — R82.71 GBS BACTERIURIA: ICD-10-CM

## 2025-03-13 DIAGNOSIS — R87.612 LGSIL ON PAP SMEAR OF CERVIX: ICD-10-CM

## 2025-03-13 DIAGNOSIS — Z3A.38 38 WEEKS GESTATION OF PREGNANCY: Primary | ICD-10-CM

## 2025-03-13 LAB
BILIRUB BLD-MCNC: NEGATIVE MG/DL
CLARITY, POC: CLEAR
COLOR UR: YELLOW
GLUCOSE UR STRIP-MCNC: NEGATIVE MG/DL
KETONES UR QL: NEGATIVE
LEUKOCYTE EST, POC: NEGATIVE
NITRITE UR-MCNC: NEGATIVE MG/ML
PH UR: 5 [PH] (ref 5–8)
PROT UR STRIP-MCNC: ABNORMAL MG/DL
RBC # UR STRIP: NEGATIVE /UL
SP GR UR: 1.02 (ref 1–1.03)
UROBILINOGEN UR QL: NORMAL

## 2025-03-13 NOTE — PROGRESS NOTES
Chief Complaint   Patient presents with    Routine Prenatal Visit       HPI: 35 y.o.  at 38w5d presenting for an OB visit. Overall feeling well today. Still with some swelling but itching has not worsened.  She denies any fevers, chills, headaches, blurry vision, chest pain, shortness of breath abdominal pain, or dysuria.  She denies any vaginal bleeding, leakage of fluid, contractions, change in fetal movement, or increased vaginal pressure.    Relevant data reviewed:    Last OB US Data (since 2024)       None          Vitals:    25 1606   BP: 118/86   Weight: 84.1 kg (185 lb 6.4 oz)     Total weight gain for pregnancy:  12.9 kg (28 lb 6.4 oz)    Cx exam:   / /        Review of systems:   Gen: negative  CV:     negative  GI: negative  :   negative  MS:    negative  Neuro: negative  Pul: negative    Physical Exam  Constitutional:       General: She is not in acute distress.     Appearance: She is not ill-appearing.   Eyes:      Pupils: Pupils are equal, round, and reactive to light.   Pulmonary:      Effort: Pulmonary effort is normal. No respiratory distress.   Abdominal:      General: There is no distension.      Palpations: Abdomen is soft.      Tenderness: There is no abdominal tenderness.   Musculoskeletal:         General: Normal range of motion.   Neurological:      General: No focal deficit present.      Mental Status: She is alert and oriented to person, place, and time.   Psychiatric:         Mood and Affect: Mood normal.         Behavior: Behavior normal.         A/P  1. Intrauterine pregnancy at 38w5d   - slight increase in protein but normal BP  - Desiring natural labor, membrane stripping next week  2. Pregnancy Risk:  NORMAL    Diagnoses and all orders for this visit:    1. 38 weeks gestation of pregnancy (Primary)    2. Encounter for  screening, unspecified  -     POC Urinalysis Dipstick    3. GBS bacteriuria- needs abx during labor    4. LGSIL on Pap smear of cervix:  2020, NEG 2021    5. Request for sterilization    6. Migraine with aura, with intractable migraine, so stated, with status migrainosus    7. History of postpartum gestational hypertension        Routine labor warnings were discussed and indications for L & D f/u including bleeding, regular contractions, decreased fetal movement or/and rupture of membranes.   -----------------------  PLAN:   Return in about 1 week (around 3/20/2025) for OB visit.    Bryson Norris MD  3/13/2025   16:33 EDT

## 2025-03-20 ENCOUNTER — ROUTINE PRENATAL (OUTPATIENT)
Dept: OBSTETRICS AND GYNECOLOGY | Facility: CLINIC | Age: 35
End: 2025-03-20
Payer: COMMERCIAL

## 2025-03-20 VITALS — WEIGHT: 189 LBS | DIASTOLIC BLOOD PRESSURE: 78 MMHG | SYSTOLIC BLOOD PRESSURE: 112 MMHG | BODY MASS INDEX: 35.71 KG/M2

## 2025-03-20 DIAGNOSIS — Z30.2 REQUEST FOR STERILIZATION: ICD-10-CM

## 2025-03-20 DIAGNOSIS — J45.20 MILD INTERMITTENT ASTHMA WITHOUT COMPLICATION: ICD-10-CM

## 2025-03-20 DIAGNOSIS — Z36.9 ENCOUNTER FOR ANTENATAL SCREENING, UNSPECIFIED: ICD-10-CM

## 2025-03-20 DIAGNOSIS — Z71.85 IMMUNIZATION COUNSELING: ICD-10-CM

## 2025-03-20 DIAGNOSIS — R82.71 GBS BACTERIURIA: Primary | ICD-10-CM

## 2025-03-20 DIAGNOSIS — Z3A.39 39 WEEKS GESTATION OF PREGNANCY: ICD-10-CM

## 2025-03-20 PROBLEM — Z34.90 PREGNANCY: Status: ACTIVE | Noted: 2025-03-20

## 2025-03-20 LAB
BILIRUB BLD-MCNC: NEGATIVE MG/DL
CLARITY, POC: CLEAR
COLOR UR: YELLOW
GLUCOSE UR STRIP-MCNC: NEGATIVE MG/DL
KETONES UR QL: NEGATIVE
LEUKOCYTE EST, POC: NEGATIVE
NITRITE UR-MCNC: NEGATIVE MG/ML
PH UR: 5 [PH] (ref 5–8)
PROT UR STRIP-MCNC: NEGATIVE MG/DL
RBC # UR STRIP: NEGATIVE /UL
SP GR UR: 1.02 (ref 1–1.03)
UROBILINOGEN UR QL: NORMAL

## 2025-03-20 NOTE — PROGRESS NOTES
Ob follow up      Shivani Hewitt is a 35 y.o.  39w5d patient being seen today for her obstetrical visit. Patient reports no complaints. Fetal movement: normal.    Desires SVE       ROS - Denies leaking fluid, vaginal bleeding and notes good fetal movement.     /78   Wt 85.7 kg (189 lb)   LMP 06/15/2024 (Approximate)   BMI 35.71 kg/m²       Vitals: VSS; AF    General Appearance:  Awake. Alert. Well developed. Well nourished. In no acute distress.    Visual Inspection: ° Abdomen was normal on visual inspection.  Palpation: ° Abdomen was soft. ° Abdominal non-tender.    Uterus: ° Fundal height was normal for gestational age. ° Not tender.  Uterine Adnexae: ° Normal without masses or tenderness.  Neurological:  ° Oriented to time, place, and person.  Skin:  ° General appearance was normal. No bruising or ecchymosis.  Obstetrical: +FM and FCA     SVE 2-3/50/-3    A/P      Diagnoses and all orders for this visit:    1. GBS bacteriuria- needs abx during labor (Primary)  Overview:  Tx in labor       2. Mild intermittent asthma without complication  Overview:  Pt has inhaler prn      3. Encounter for  screening, unspecified  -     POC Urinalysis Dipstick    4. Immunization counseling  Overview:  Tdap Received [id number]   Declined Flu vaccine      5. 39 weeks gestation of pregnancy  Overview:  CF/FX/SMA neg, MT21 neg; AFP low risk       6. Request for sterilization  Overview:  Discussed today desires interval salpingectomy w/Dr Bay ( [id number])         IOL this Saturday   GBS+; Start PCN on admission     I confirm that all copied/forwarded documentation in this record has been carefully reviewed, updated as necessary, and accurately reflects the patient's current status and plan of care.        Carter Bay, DO     3/20/2025  14:00 EDT

## 2025-03-22 ENCOUNTER — HOSPITAL ENCOUNTER (OUTPATIENT)
Dept: LABOR AND DELIVERY | Facility: HOSPITAL | Age: 35
Discharge: HOME | End: 2025-03-22
Payer: COMMERCIAL

## 2025-03-22 ENCOUNTER — HOSPITAL ENCOUNTER (INPATIENT)
Facility: HOSPITAL | Age: 35
LOS: 2 days | Discharge: HOME | End: 2025-03-24
Attending: STUDENT IN AN ORGANIZED HEALTH CARE EDUCATION/TRAINING PROGRAM | Admitting: STUDENT IN AN ORGANIZED HEALTH CARE EDUCATION/TRAINING PROGRAM
Payer: COMMERCIAL

## 2025-03-22 ENCOUNTER — ANESTHESIA EVENT (OUTPATIENT)
Dept: OBSTETRICS AND GYNECOLOGY | Facility: HOSPITAL | Age: 35
End: 2025-03-22
Payer: COMMERCIAL

## 2025-03-22 ENCOUNTER — ANESTHESIA (OUTPATIENT)
Dept: OBSTETRICS AND GYNECOLOGY | Facility: HOSPITAL | Age: 35
End: 2025-03-22
Payer: COMMERCIAL

## 2025-03-22 LAB
A1 MICROGLOB PLACENTAL VAG QL: NEGATIVE
ABO GROUP BLD: NORMAL
ABO GROUP BLD: NORMAL
ALBUMIN SERPL-MCNC: 3.2 G/DL (ref 3.5–5.2)
ALBUMIN/GLOB SERPL: 1.1 G/DL
ALP SERPL-CCNC: 232 U/L (ref 39–117)
ALT SERPL W P-5'-P-CCNC: 20 U/L (ref 1–33)
AMPHET+METHAMPHET UR QL: NEGATIVE
AMPHETAMINES UR QL: NEGATIVE
ANION GAP SERPL CALCULATED.3IONS-SCNC: 12.3 MMOL/L (ref 5–15)
AST SERPL-CCNC: 36 U/L (ref 1–32)
BACTERIA UR QL AUTO: ABNORMAL /HPF
BARBITURATES UR QL SCN: NEGATIVE
BENZODIAZ UR QL SCN: NEGATIVE
BILIRUB SERPL-MCNC: 0.3 MG/DL (ref 0–1.2)
BILIRUB UR QL STRIP: NEGATIVE
BLD GP AB SCN SERPL QL: NEGATIVE
BUN SERPL-MCNC: 13 MG/DL (ref 6–20)
BUN/CREAT SERPL: 25 (ref 7–25)
BUPRENORPHINE SERPL-MCNC: NEGATIVE NG/ML
CALCIUM SPEC-SCNC: 8.6 MG/DL (ref 8.6–10.5)
CANNABINOIDS SERPL QL: NEGATIVE
CHLORIDE SERPL-SCNC: 105 MMOL/L (ref 98–107)
CLARITY UR: CLEAR
CO2 SERPL-SCNC: 17.7 MMOL/L (ref 22–29)
COCAINE UR QL: NEGATIVE
COLOR UR: YELLOW
CREAT SERPL-MCNC: 0.52 MG/DL (ref 0.57–1)
CREAT UR-MCNC: 74.9 MG/DL
DEPRECATED RDW RBC AUTO: 47.8 FL (ref 37–54)
EGFRCR SERPLBLD CKD-EPI 2021: 125.2 ML/MIN/1.73
ERYTHROCYTE [DISTWIDTH] IN BLOOD BY AUTOMATED COUNT: 14.6 % (ref 12.3–15.4)
GLOBULIN UR ELPH-MCNC: 3 GM/DL
GLUCOSE SERPL-MCNC: 95 MG/DL (ref 65–99)
GLUCOSE UR STRIP-MCNC: NEGATIVE MG/DL
HCT VFR BLD AUTO: 38.2 % (ref 34–46.6)
HGB BLD-MCNC: 12.6 G/DL (ref 12–15.9)
HGB UR QL STRIP.AUTO: ABNORMAL
HYALINE CASTS UR QL AUTO: ABNORMAL /LPF
KETONES UR QL STRIP: NEGATIVE
LDH SERPL-CCNC: 204 U/L (ref 135–214)
LEUKOCYTE ESTERASE UR QL STRIP.AUTO: ABNORMAL
MCH RBC QN AUTO: 30 PG (ref 26.6–33)
MCHC RBC AUTO-ENTMCNC: 33 G/DL (ref 31.5–35.7)
MCV RBC AUTO: 91 FL (ref 79–97)
METHADONE UR QL SCN: NEGATIVE
NITRITE UR QL STRIP: NEGATIVE
OPIATES UR QL: NEGATIVE
OXYCODONE UR QL SCN: NEGATIVE
PCP UR QL SCN: NEGATIVE
PH UR STRIP.AUTO: 6.5 [PH] (ref 4.5–8)
PLATELET # BLD AUTO: 159 10*3/MM3 (ref 140–450)
PMV BLD AUTO: 13 FL (ref 6–12)
POTASSIUM SERPL-SCNC: 4.1 MMOL/L (ref 3.5–5.2)
PROT ?TM UR-MCNC: 44.9 MG/DL
PROT SERPL-MCNC: 6.2 G/DL (ref 6–8.5)
PROT UR QL STRIP: ABNORMAL
PROT/CREAT UR: 599.5 MG/G CREA (ref 0–200)
RBC # BLD AUTO: 4.2 10*6/MM3 (ref 3.77–5.28)
RBC # UR STRIP: ABNORMAL /HPF
REF LAB TEST METHOD: ABNORMAL
RH BLD: POSITIVE
RH BLD: POSITIVE
SODIUM SERPL-SCNC: 135 MMOL/L (ref 136–145)
SP GR UR STRIP: 1.02 (ref 1–1.03)
SQUAMOUS #/AREA URNS HPF: ABNORMAL /HPF
T&S EXPIRATION DATE: NORMAL
TREPONEMA PALLIDUM IGG+IGM AB [PRESENCE] IN SERUM OR PLASMA BY IMMUNOASSAY: NORMAL
TRICYCLICS UR QL SCN: NEGATIVE
URATE SERPL-MCNC: 4.9 MG/DL (ref 2.4–5.7)
UROBILINOGEN UR QL STRIP: ABNORMAL
WBC # UR STRIP: ABNORMAL /HPF
WBC NRBC COR # BLD AUTO: 8.37 10*3/MM3 (ref 3.4–10.8)

## 2025-03-22 PROCEDURE — 83615 LACTATE (LD) (LDH) ENZYME: CPT | Performed by: STUDENT IN AN ORGANIZED HEALTH CARE EDUCATION/TRAINING PROGRAM

## 2025-03-22 PROCEDURE — 86900 BLOOD TYPING SEROLOGIC ABO: CPT | Performed by: STUDENT IN AN ORGANIZED HEALTH CARE EDUCATION/TRAINING PROGRAM

## 2025-03-22 PROCEDURE — 84112 EVAL AMNIOTIC FLUID PROTEIN: CPT | Performed by: STUDENT IN AN ORGANIZED HEALTH CARE EDUCATION/TRAINING PROGRAM

## 2025-03-22 PROCEDURE — 86900 BLOOD TYPING SEROLOGIC ABO: CPT

## 2025-03-22 PROCEDURE — C1755 CATHETER, INTRASPINAL: HCPCS | Performed by: ANESTHESIOLOGY

## 2025-03-22 PROCEDURE — 25010000002 PENICILLIN G POTASSIUM PER 600000 UNITS: Performed by: STUDENT IN AN ORGANIZED HEALTH CARE EDUCATION/TRAINING PROGRAM

## 2025-03-22 PROCEDURE — 82570 ASSAY OF URINE CREATININE: CPT | Performed by: STUDENT IN AN ORGANIZED HEALTH CARE EDUCATION/TRAINING PROGRAM

## 2025-03-22 PROCEDURE — 80053 COMPREHEN METABOLIC PANEL: CPT | Performed by: STUDENT IN AN ORGANIZED HEALTH CARE EDUCATION/TRAINING PROGRAM

## 2025-03-22 PROCEDURE — 84550 ASSAY OF BLOOD/URIC ACID: CPT | Performed by: STUDENT IN AN ORGANIZED HEALTH CARE EDUCATION/TRAINING PROGRAM

## 2025-03-22 PROCEDURE — 86780 TREPONEMA PALLIDUM: CPT | Performed by: STUDENT IN AN ORGANIZED HEALTH CARE EDUCATION/TRAINING PROGRAM

## 2025-03-22 PROCEDURE — 25010000002 METOCLOPRAMIDE PER 10 MG: Performed by: STUDENT IN AN ORGANIZED HEALTH CARE EDUCATION/TRAINING PROGRAM

## 2025-03-22 PROCEDURE — 81001 URINALYSIS AUTO W/SCOPE: CPT | Performed by: STUDENT IN AN ORGANIZED HEALTH CARE EDUCATION/TRAINING PROGRAM

## 2025-03-22 PROCEDURE — 25810000003 LACTATED RINGERS PER 1000 ML: Performed by: STUDENT IN AN ORGANIZED HEALTH CARE EDUCATION/TRAINING PROGRAM

## 2025-03-22 PROCEDURE — 85027 COMPLETE CBC AUTOMATED: CPT | Performed by: STUDENT IN AN ORGANIZED HEALTH CARE EDUCATION/TRAINING PROGRAM

## 2025-03-22 PROCEDURE — 80306 DRUG TEST PRSMV INSTRMNT: CPT | Performed by: STUDENT IN AN ORGANIZED HEALTH CARE EDUCATION/TRAINING PROGRAM

## 2025-03-22 PROCEDURE — 86901 BLOOD TYPING SEROLOGIC RH(D): CPT

## 2025-03-22 PROCEDURE — 84156 ASSAY OF PROTEIN URINE: CPT | Performed by: STUDENT IN AN ORGANIZED HEALTH CARE EDUCATION/TRAINING PROGRAM

## 2025-03-22 PROCEDURE — 86901 BLOOD TYPING SEROLOGIC RH(D): CPT | Performed by: STUDENT IN AN ORGANIZED HEALTH CARE EDUCATION/TRAINING PROGRAM

## 2025-03-22 PROCEDURE — 3E033VJ INTRODUCTION OF OTHER HORMONE INTO PERIPHERAL VEIN, PERCUTANEOUS APPROACH: ICD-10-PCS | Performed by: STUDENT IN AN ORGANIZED HEALTH CARE EDUCATION/TRAINING PROGRAM

## 2025-03-22 PROCEDURE — 25010000002 LIDOCAINE-EPINEPHRINE (PF) 1.5 %-1:200000 SOLUTION: Performed by: ANESTHESIOLOGY

## 2025-03-22 PROCEDURE — 86850 RBC ANTIBODY SCREEN: CPT | Performed by: STUDENT IN AN ORGANIZED HEALTH CARE EDUCATION/TRAINING PROGRAM

## 2025-03-22 PROCEDURE — 10907ZC DRAINAGE OF AMNIOTIC FLUID, THERAPEUTIC FROM PRODUCTS OF CONCEPTION, VIA NATURAL OR ARTIFICIAL OPENING: ICD-10-PCS | Performed by: STUDENT IN AN ORGANIZED HEALTH CARE EDUCATION/TRAINING PROGRAM

## 2025-03-22 PROCEDURE — S0260 H&P FOR SURGERY: HCPCS | Performed by: STUDENT IN AN ORGANIZED HEALTH CARE EDUCATION/TRAINING PROGRAM

## 2025-03-22 RX ORDER — OXYTOCIN/0.9 % SODIUM CHLORIDE 30/500 ML
2-20 PLASTIC BAG, INJECTION (ML) INTRAVENOUS
Status: DISCONTINUED | OUTPATIENT
Start: 2025-03-22 | End: 2025-03-23

## 2025-03-22 RX ORDER — MISOPROSTOL 200 UG/1
800 TABLET ORAL ONCE AS NEEDED
OUTPATIENT
Start: 2025-03-24

## 2025-03-22 RX ORDER — METHYLERGONOVINE MALEATE 0.2 MG/ML
200 INJECTION INTRAVENOUS ONCE AS NEEDED
OUTPATIENT
Start: 2025-03-24

## 2025-03-22 RX ORDER — FENTANYL/BUPIVACAINE/NS/PF 2-1250MCG
PLASTIC BAG, INJECTION (ML) INJECTION CONTINUOUS
Refills: 0 | Status: DISCONTINUED | OUTPATIENT
Start: 2025-03-22 | End: 2025-03-23

## 2025-03-22 RX ORDER — OXYTOCIN/0.9 % SODIUM CHLORIDE 30/500 ML
2-20 PLASTIC BAG, INJECTION (ML) INTRAVENOUS
Status: DISCONTINUED | OUTPATIENT
Start: 2025-03-22 | End: 2025-03-22

## 2025-03-22 RX ORDER — LIDOCAINE HYDROCHLORIDE AND EPINEPHRINE 15; 5 MG/ML; UG/ML
INJECTION, SOLUTION EPIDURAL AS NEEDED
Status: DISCONTINUED | OUTPATIENT
Start: 2025-03-22 | End: 2025-03-23 | Stop reason: SURG

## 2025-03-22 RX ORDER — PENICILLIN G 3000000 [IU]/50ML
3 INJECTION, SOLUTION INTRAVENOUS EVERY 4 HOURS
Status: DISCONTINUED | OUTPATIENT
Start: 2025-03-22 | End: 2025-03-23

## 2025-03-22 RX ORDER — MAGNESIUM CARB/ALUMINUM HYDROX 105-160MG
30 TABLET,CHEWABLE ORAL ONCE
Status: DISCONTINUED | OUTPATIENT
Start: 2025-03-22 | End: 2025-03-23

## 2025-03-22 RX ORDER — SODIUM CHLORIDE 0.9 % (FLUSH) 0.9 %
10 SYRINGE (ML) INJECTION AS NEEDED
Status: DISCONTINUED | OUTPATIENT
Start: 2025-03-22 | End: 2025-03-23

## 2025-03-22 RX ORDER — EPHEDRINE SULFATE 5 MG/ML
10 INJECTION INTRAVENOUS
Status: DISCONTINUED | OUTPATIENT
Start: 2025-03-22 | End: 2025-03-23

## 2025-03-22 RX ORDER — METOCLOPRAMIDE HYDROCHLORIDE 5 MG/ML
10 INJECTION INTRAMUSCULAR; INTRAVENOUS EVERY 6 HOURS
Status: DISCONTINUED | OUTPATIENT
Start: 2025-03-22 | End: 2025-03-23

## 2025-03-22 RX ORDER — ACETAMINOPHEN 325 MG/1
650 TABLET ORAL EVERY 4 HOURS PRN
Status: DISCONTINUED | OUTPATIENT
Start: 2025-03-22 | End: 2025-03-23

## 2025-03-22 RX ORDER — FENTANYL/BUPIVACAINE/NS/PF 2-1250MCG
PLASTIC BAG, INJECTION (ML) INJECTION
Status: COMPLETED
Start: 2025-03-22 | End: 2025-03-22

## 2025-03-22 RX ORDER — LIDOCAINE HYDROCHLORIDE 10 MG/ML
0.5 INJECTION, SOLUTION EPIDURAL; INFILTRATION; INTRACAUDAL; PERINEURAL ONCE AS NEEDED
Status: DISCONTINUED | OUTPATIENT
Start: 2025-03-22 | End: 2025-03-23

## 2025-03-22 RX ORDER — SODIUM CHLORIDE, SODIUM LACTATE, POTASSIUM CHLORIDE, CALCIUM CHLORIDE 600; 310; 30; 20 MG/100ML; MG/100ML; MG/100ML; MG/100ML
125 INJECTION, SOLUTION INTRAVENOUS CONTINUOUS
Status: DISCONTINUED | OUTPATIENT
Start: 2025-03-22 | End: 2025-03-23

## 2025-03-22 RX ORDER — SODIUM CHLORIDE 9 MG/ML
40 INJECTION, SOLUTION INTRAVENOUS AS NEEDED
Status: DISCONTINUED | OUTPATIENT
Start: 2025-03-22 | End: 2025-03-23

## 2025-03-22 RX ORDER — SODIUM CHLORIDE 0.9 % (FLUSH) 0.9 %
10 SYRINGE (ML) INJECTION EVERY 12 HOURS SCHEDULED
Status: DISCONTINUED | OUTPATIENT
Start: 2025-03-22 | End: 2025-03-23

## 2025-03-22 RX ORDER — CARBOPROST TROMETHAMINE 250 UG/ML
250 INJECTION, SOLUTION INTRAMUSCULAR
OUTPATIENT
Start: 2025-03-24

## 2025-03-22 RX ADMIN — SODIUM CHLORIDE 5 MILLION UNITS: 9 INJECTION, SOLUTION INTRAVENOUS at 10:59

## 2025-03-22 RX ADMIN — LIDOCAINE HYDROCHLORIDE AND EPINEPHRINE 2 MG: 15; 5 INJECTION, SOLUTION EPIDURAL at 14:16

## 2025-03-22 RX ADMIN — PENICILLIN G 3 MILLION UNITS: 3000000 INJECTION, SOLUTION INTRAVENOUS at 22:26

## 2025-03-22 RX ADMIN — SODIUM CHLORIDE, SODIUM LACTATE, POTASSIUM CHLORIDE, AND CALCIUM CHLORIDE 125 ML/HR: 600; 310; 30; 20 INJECTION, SOLUTION INTRAVENOUS at 21:43

## 2025-03-22 RX ADMIN — Medication 10 ML/HR: at 14:24

## 2025-03-22 RX ADMIN — Medication 10 ML/HR: at 22:25

## 2025-03-22 RX ADMIN — SODIUM CHLORIDE, SODIUM LACTATE, POTASSIUM CHLORIDE, AND CALCIUM CHLORIDE 125 ML/HR: 600; 310; 30; 20 INJECTION, SOLUTION INTRAVENOUS at 10:55

## 2025-03-22 RX ADMIN — Medication 10 ML: at 21:06

## 2025-03-22 RX ADMIN — PENICILLIN G 3 MILLION UNITS: 3000000 INJECTION, SOLUTION INTRAVENOUS at 18:40

## 2025-03-22 RX ADMIN — Medication 2 MILLI-UNITS/MIN: at 12:05

## 2025-03-22 RX ADMIN — LIDOCAINE HYDROCHLORIDE AND EPINEPHRINE 3 MG: 15; 5 INJECTION, SOLUTION EPIDURAL at 14:13

## 2025-03-22 RX ADMIN — METOCLOPRAMIDE 10 MG: 5 INJECTION, SOLUTION INTRAMUSCULAR; INTRAVENOUS at 10:54

## 2025-03-22 RX ADMIN — PENICILLIN G 3 MILLION UNITS: 3000000 INJECTION, SOLUTION INTRAVENOUS at 14:49

## 2025-03-22 NOTE — PLAN OF CARE
Goal Outcome Evaluation:  Plan of Care Reviewed With: significant other        Progress: improving  Outcome Evaluation: VSS. GBS positive, treated x3. Complaining of headache intermittently, denies visual changes, epigastric pain, or RUQ pain. Pt comfortable with epidural. FHR intermittently category II, resolves with recussication efforts. Plan to restart pitocin at 1900 after break.       Problem: Adult Inpatient Plan of Care  Goal: Plan of Care Review  Outcome: Progressing  Flowsheets (Taken 3/22/2025 1848)  Progress: improving  Outcome Evaluation: VSS. GBS positive, treated x3. Complaining of headache intermittently, denies visual changes, epigastric pain, or RUQ pain. Pt comfortable with epidural. FHR intermittently category II, resolves with recussication efforts. Plan to restart pitocin at 1900 after break.  Plan of Care Reviewed With: significant other  Goal: Patient-Specific Goal (Individualized)  Outcome: Progressing  Flowsheets (Taken 3/22/2025 1848)  Individualized Care Needs: Pre-E precautions  Goal: Absence of Hospital-Acquired Illness or Injury  Outcome: Progressing  Intervention: Identify and Manage Fall Risk  Recent Flowsheet Documentation  Taken 3/22/2025 1630 by Gia Goncalves RN  Safety Promotion/Fall Prevention: safety round/check completed  Goal: Optimal Comfort and Wellbeing  Outcome: Progressing  Intervention: Provide Person-Centered Care  Recent Flowsheet Documentation  Taken 3/22/2025 1630 by Gia Goncalves RN  Trust Relationship/Rapport:   care explained   choices provided  Goal: Readiness for Transition of Care  Outcome: Progressing     Problem: Labor  Goal: Hemostasis  Outcome: Progressing  Goal: Stable Fetal Wellbeing  Outcome: Progressing  Goal: Effective Progression to Delivery  Outcome: Progressing  Goal: Absence of Infection Signs and Symptoms  Outcome: Progressing  Goal: Acceptable Pain Control  Outcome: Progressing  Goal: Normal Uterine Contraction Pattern  Outcome: Progressing

## 2025-03-22 NOTE — PROGRESS NOTES
Patient comfortable after epidural.  Cervix checked and 3/50/-3, moderate consistency, posterior.  Membranes ruptured for small amount of clear fluid, fetal head well engaged after rupture.  Had small period of late decelerations about 2 hours ago but tracing otherwise category 1.  Continue Pitocin augmentation.  Discussed internal monitoring as needed.     Bryson Norris MD  3/22/2025   15:32 EDT

## 2025-03-22 NOTE — ANESTHESIA PREPROCEDURE EVALUATION
Anesthesia Evaluation     Patient summary reviewed and Nursing notes reviewed   no history of anesthetic complications:   NPO Solid Status: N/A  NPO Liquid Status: N/A           Airway   Mallampati: II  TM distance: >3 FB  Neck ROM: full  No difficulty expected  Dental - normal exam     Pulmonary - normal exam   (+) asthma,  Cardiovascular - normal exam    Rhythm: regular  Rate: normal    Hypertension: no meds.      Neuro/Psych  (+) headaches  GI/Hepatic/Renal/Endo    (+) GERD    Musculoskeletal     Abdominal  - normal exam   Substance History - negative use  (-) alcohol use, drug use     OB/GYN    (+) Pregnant        Other - negative ROS                   Anesthesia Plan    ASA 2     epidural       Anesthetic plan, risks, benefits, and alternatives have been provided, discussed and informed consent has been obtained with: patient and spouse/significant other.  Pre-procedure education provided    CODE STATUS:    Code Status (Patient has no pulse and is not breathing): CPR (Attempt to Resuscitate)  Medical Interventions (Patient has pulse or is breathing): Full Support  Level Of Support Discussed With: Patient

## 2025-03-22 NOTE — ANESTHESIA PROCEDURE NOTES
Labor Epidural    Pre-sedation assessment completed: 3/22/2025 1:57 PM    Patient reassessed immediately prior to procedure    Patient location during procedure: OB  Start Time: 3/22/2025 2:11 PM  Stop Time: 3/22/2025 2:15 PM  Performed By  Anesthesiologist: Madisyn Okeefe MD  Preanesthetic Checklist  Completed: patient identified, IV checked, site marked, risks and benefits discussed, surgical consent, monitors and equipment checked, pre-op evaluation and timeout performed  Additional Notes  Dural puncture with 25G shahrzad.  Prep:  Pt Position:sitting  Sterile Tech:cap, gloves, mask and sterile barrier  Prep:chlorhexidine gluconate and isopropyl alcohol  Monitoring:blood pressure monitoring, continuous pulse oximetry and EKG (FHTs)  Epidural Block Procedure:  Approach:midline  Guidance:landmark technique and palpation technique  Location:L4-L5  Needle Type:Tuohy  Needle Gauge:17 G  Loss of Resistance Medium: saline  Loss of Resistance: 8cm  Catheter at skin depth (cm): threaded 4 cm.  Paresthesia: none  Aspiration:negative  Test Dose:negative  Number of Attempts: 1  Post Assessment:  Dressing:occlusive dressing applied and secured with tape  Pt Tolerance:patient tolerated the procedure well with no apparent complications  Complications:no

## 2025-03-22 NOTE — H&P
REGINO Crooks  Obstetric History and Physical    Chief Complaint   Patient presents with    Laboring       Subjective     Patient is a 35 y.o. female  currently at 40w0d, who presents with symptoms of leaking of fluid.  Was scheduled for induction this morning but was delayed due to staffing.  Also complains of a headache and had elevated blood pressure on presentation.  Having some uterine activity described as cramping. She denies any fevers, chills, blurry vision, chest pain, shortness of breath abdominal pain, dysuria, or leg swelling. She denies any vaginal bleeding, change in fetal movement, or increased vaginal pressure.  GBS positive and passed her GTT.  Her prenatal care is benign.  Her previous obstetric/gynecological history is noted for is non-contributory.    The following portions of the patients history were reviewed and updated as appropriate: current medications, allergies, past medical history, past surgical history, past social history, and problem list .       Prenatal Information:  Prenatal Results       Initial Prenatal Labs       Test Value Reference Range Date Time    Hemoglobin  13.9 g/dL 11.1 - 15.9 10/14/24 1411    Hematocrit  40.4 % 34.0 - 46.6 10/14/24 1411    Platelets  250 x10E3/uL 150 - 450 10/14/24 1411    Rubella IgG  9.47 index Immune >0.99 10/14/24 1411    Hepatitis B SAg  Negative  Negative 10/14/24 1411    Hepatitis C Ab  Non Reactive  Non Reactive 10/14/24 1411    RPR  Non Reactive  Non Reactive 24 0915       Non Reactive  Non Reactive 10/14/24 1411    T. Pallidum Ab         ABO  O   24 0915    Rh  Positive   24 0915    Antibody Screen  Negative  Negative 10/14/24 1411    HIV  Non Reactive  Non Reactive 10/14/24 1411    Urine Culture  Final report   25 1601       Final report   10/30/24 1436       Final report   10/14/24 1512    Gonorrhea  Negative  Negative 10/14/24 1515    Chlamydia  Negative  Negative 10/14/24 1515    TSH  1.410 uIU/mL 0.450 -  4.500 01/08/25 1551       1.200 uIU/mL 0.270 - 4.200 10/14/24 1359    HgB A1c   5.6 % 4.8 - 5.6 10/14/24 1411    Varicella IgG  Reactive  Non Reactive 10/14/24 1411    Hemoglobinopathy Fractionation  Comment   10/14/24 1411    Hemoglobinopathy (genetic testing)        Cystic fibrosis  ^ Neg   01/23/25     Spinal muscular atrophy ^ Neg   01/23/25     Fragile X ^ Neg   01/23/25               Fetal testing        Test Value Reference Range Date Time    NIPT ^ Neg   10/14/24     MSAFP  *Screen Negative*   10/30/24 1439    AFP-4                  2nd and 3rd Trimester       Test Value Reference Range Date Time    Hemoglobin (repeated)  13.9 g/dL 11.1 - 15.9 03/06/25 1629       13.5 g/dL 12.0 - 15.9 02/05/25 1323    Hematocrit (repeated)  40.6 % 34.0 - 46.6 03/06/25 1629       38.8 % 34.0 - 46.6 02/05/25 1323    Platelets   170 x10E3/uL 150 - 450 03/06/25 1629       206 10*3/mm3 140 - 450 02/05/25 1323       250 x10E3/uL 150 - 450 10/14/24 1411    1 hour GTT         Antibody Screen (repeated)        3rd TM syphilis scrn (repeated)  RPR   Non Reactive  Non Reactive 12/23/24 0915    3rd TM syphilis scrn (repeated) TP-Ab        3rd TM syphilis screen TB-Ab (FTA)        Syphilis cascade test TP-Ab (EIA)        Syphilis cascade TPPA        GTT Fasting  90 mg/dL 70 - 91 12/23/24 0915    GTT 1 Hr  163 mg/dL 70 - 179 12/23/24 0915    GTT 2 Hr  112 mg/dL 70 - 152 12/23/24 0915    GTT 3 Hr        Group B Strep ^ Positive in Urine   10/30/24               Other testing        Test Value Reference Range Date Time    Parvo IgG         CMV IgG                   Drug Screening       Test Value Reference Range Date Time    Amphetamine Screen  Negative ng/mL Zxhyhu=0716 10/14/24 1512    Barbiturate Screen  Negative ng/mL Koerss=466 10/14/24 1512    Benzodiazepine Screen  Negative ng/mL Dixflr=404 10/14/24 1512    Methadone Screen  Negative ng/mL Aenanh=686 10/14/24 1512    Phencyclidine Screen  Negative ng/mL Cutoff=25 10/14/24 1517     Opiates Screen  Negative ng/mL Wioxwb=599 10/14/24 1512    THC Screen  Negative ng/mL Cutoff=20 10/14/24 1512    Cocaine Screen  Negative ng/mL Vkcuto=035 10/14/24 1512    Propoxyphene Screen  Negative ng/mL Cepnjh=843 10/14/24 1512    Buprenorphine Screen        Methamphetamine Screen        Oxycodone Screen        Tricyclic Antidepressants Screen                  Legend    ^: Historical                          External Prenatal Results       Pregnancy Outside Results - Transcribed From Office Records - See Scanned Records For Details       Test Value Date Time    ABO  O  12/23/24 0915    Rh  Positive  12/23/24 0915    Antibody Screen  Negative  10/14/24 1411    Varicella IgG  Reactive  10/14/24 1411    Rubella  9.47 index 10/14/24 1411    Hgb  13.9 g/dL 03/06/25 1629       13.5 g/dL 02/05/25 1323       13.9 g/dL 10/14/24 1411    Hct  40.6 % 03/06/25 1629       38.8 % 02/05/25 1323       40.4 % 10/14/24 1411    HgB A1c   5.6 % 10/14/24 1411    1h GTT       3h GTT Fasting  90 mg/dL 12/23/24 0915    3h GTT 1 hour  163 mg/dL 12/23/24 0915    3h GTT 2 hour  112 mg/dL 12/23/24 0915    3h GTT 3 hour        Gonorrhea (discrete)  Negative  10/14/24 1515    Chlamydia (discrete)  Negative  10/14/24 1515    RPR  Non Reactive  12/23/24 0915       Non Reactive  10/14/24 1411    Syphils cascade: TP-Ab (FTA)       TP-Ab       TP-Ab (EIA)       TPPA       HBsAg  Negative  10/14/24 1411    Herpes Simplex Virus PCR       Herpes Simplex VIrus Culture       HIV  Non Reactive  10/14/24 1411    Hep C RNA Quant PCR       Hep C Antibody  Non Reactive  10/14/24 1411    AFP  84.1 ng/mL 10/30/24 1439    NIPT ^ Neg  10/14/24     Cystic Fibrosis (Christopher) ^ Neg  01/23/25     Cystic Fibroisis  ^ Negative  10/14/24     Spinal Muscular atrophy ^ Neg  01/23/25     Fragile X ^ Neg  01/23/25     Group B Strep ^ Positive in Urine  10/30/24     GBS Susceptibility to Clindamycin       GBS Susceptibility to Erythromycin       Fetal Fibronectin        Genetic Testing, Maternal Blood                 Drug Screening       Test Value Date Time    Urine Drug Screen       Amphetamine Screen  Negative ng/mL 10/14/24 1512    Barbiturate Screen  Negative ng/mL 10/14/24 1512    Benzodiazepine Screen  Negative ng/mL 10/14/24 1512    Methadone Screen  Negative ng/mL 10/14/24 1512    Phencyclidine Screen  Negative ng/mL 10/14/24 1512    Opiates Screen       THC Screen       Cocaine Screen       Propoxyphene Screen  Negative ng/mL 10/14/24 1512    Buprenorphine Screen       Methamphetamine Screen       Oxycodone Screen       Tricyclic Antidepressants Screen                 Legend    ^: Historical                             Past OB History:     OB History    Para Term  AB Living   3 2 2 0 0 2   SAB IAB Ectopic Molar Multiple Live Births   0 0 0 0 0 2      # Outcome Date GA Lbr Willis/2nd Weight Sex Type Anes PTL Lv   3 Current            2 Term 14   3685 g (8 lb 2 oz) F Vag-Spont   JIAN   1 Term 13 39w0d  3033 g (6 lb 11 oz) M Vag-Spont EPI N JIAN      Birth Comments: System Generated. Please review and update pregnancy details.      Name: Ernst      Obstetric Comments    x 2- proven pelvis 8#2oz   Hx of GHTN post-delivery- received Mag       Past Medical History: Past Medical History:   Diagnosis Date    Asthma       Past Surgical History Past Surgical History:   Procedure Laterality Date    CHOLECYSTECTOMY        Family History: Family History   Problem Relation Age of Onset    No Known Problems Father     Diabetes Mother       Social History:  reports that she has never smoked. She has never used smokeless tobacco.   reports no history of alcohol use.   reports no history of drug use.        General ROS: Pertinent items are noted in HPI    Objective       Vital Signs Range for the last 24 hours  Temperature:     Temp Source:     BP:     Pulse:     Respirations:     SPO2:     O2 Amount (l/min):     O2 Devices     Weight:       Physical Examination:  General appearance - alert, well appearing, and in no distress  Chest - no increased work of breathing  Abdomen - soft, nontender, nondistended, no masses or organomegaly  Neurological - alert, oriented, normal speech, no focal findings or movement disorder noted    Presentation: cephalic   Cervix: Exam by:     Dilation:  2   Effacement:  50   Station:  -3       Fetal Heart Rate Assessment   Method:     Beats/min:     Baseline:     Variability:     Accels:     Decels:     Tracing Category:       Uterine Assessment   Method:     Frequency (min):     Ctx Count in 10 min:     Duration:     Intensity:     Intensity by IUPC:     Resting Tone:     Resting Tone by IUPC:     [place] Units:       Assessment & Plan       Pregnancy        Assessment:  1.  Intrauterine pregnancy at 40w0d gestation with reassuring fetal status.    2.  induction of labor  for elevated BP at term     3.  Obstetrical history significant for is non-contributory.  4.  GBS status:   Strep Gp B ASHLEY   Date Value Ref Range Status   10/30/2024 Positive in Urine  Final       Plan:  1. Vaginal anticipated.  AmniSure negative for ROM though mild range systolic and diastolic on presentation on her day of induction.  Will begin Pitocin 2 x 2 and penicillin for GBS prophylaxis.  Pre-E labs collected. Treating her headache. Anticipate AROM in 4 hours. Epidural PRN  2. Plan of care has been reviewed with patient and nursing staff  3.  Risks, benefits of treatment plan have been discussed.  4.  All questions have been answered.  5.  Pt. counseled as to risk of labor and delivery including but not limited to infection, bleeding (with possible need for blood transfusion, and its inherent risks of virus transmission, i.e. HIV, Hepatitis; possible transfusion reaction), possible emergent  section with its risks of damage to internal organs and necessary repairs, possible operative vaginal delivery, NON routine use of episiotomy and routine use of internal  monitors and associated risks, anesthetic complications, injury to infant or mother at time of delivery, maternal or fetal death.  The pt. understands these risks and is willing to proceed.  All of her questions were answered.      Bryson Norris MD  3/22/2025  10:32 EDT

## 2025-03-23 PROCEDURE — 0UQMXZZ REPAIR VULVA, EXTERNAL APPROACH: ICD-10-PCS | Performed by: STUDENT IN AN ORGANIZED HEALTH CARE EDUCATION/TRAINING PROGRAM

## 2025-03-23 PROCEDURE — 59410 OBSTETRICAL CARE: CPT | Performed by: STUDENT IN AN ORGANIZED HEALTH CARE EDUCATION/TRAINING PROGRAM

## 2025-03-23 RX ORDER — DOCUSATE SODIUM 100 MG/1
100 CAPSULE, LIQUID FILLED ORAL 2 TIMES DAILY
Status: DISCONTINUED | OUTPATIENT
Start: 2025-03-23 | End: 2025-03-24 | Stop reason: HOSPADM

## 2025-03-23 RX ORDER — HYDROCORTISONE 25 MG/G
1 CREAM TOPICAL AS NEEDED
Status: DISCONTINUED | OUTPATIENT
Start: 2025-03-23 | End: 2025-03-24 | Stop reason: HOSPADM

## 2025-03-23 RX ORDER — IBUPROFEN 600 MG/1
600 TABLET, FILM COATED ORAL EVERY 6 HOURS PRN
Status: DISCONTINUED | OUTPATIENT
Start: 2025-03-23 | End: 2025-03-24 | Stop reason: HOSPADM

## 2025-03-23 RX ORDER — OXYTOCIN/0.9 % SODIUM CHLORIDE 30/500 ML
999 PLASTIC BAG, INJECTION (ML) INTRAVENOUS ONCE
Status: DISCONTINUED | OUTPATIENT
Start: 2025-03-23 | End: 2025-03-24 | Stop reason: HOSPADM

## 2025-03-23 RX ORDER — DIPHENHYDRAMINE HCL 25 MG
25 CAPSULE ORAL NIGHTLY PRN
Status: DISCONTINUED | OUTPATIENT
Start: 2025-03-23 | End: 2025-03-24 | Stop reason: HOSPADM

## 2025-03-23 RX ORDER — OXYTOCIN/0.9 % SODIUM CHLORIDE 30/500 ML
250 PLASTIC BAG, INJECTION (ML) INTRAVENOUS CONTINUOUS
Status: ACTIVE | OUTPATIENT
Start: 2025-03-23 | End: 2025-03-23

## 2025-03-23 RX ORDER — CALCIUM CARBONATE 500 MG/1
2 TABLET, CHEWABLE ORAL 3 TIMES DAILY PRN
Status: DISCONTINUED | OUTPATIENT
Start: 2025-03-23 | End: 2025-03-24 | Stop reason: HOSPADM

## 2025-03-23 RX ORDER — ONDANSETRON 4 MG/1
4 TABLET, ORALLY DISINTEGRATING ORAL EVERY 6 HOURS PRN
Status: DISCONTINUED | OUTPATIENT
Start: 2025-03-23 | End: 2025-03-24 | Stop reason: HOSPADM

## 2025-03-23 RX ORDER — ACETAMINOPHEN 325 MG/1
650 TABLET ORAL EVERY 6 HOURS PRN
Status: DISCONTINUED | OUTPATIENT
Start: 2025-03-23 | End: 2025-03-24 | Stop reason: HOSPADM

## 2025-03-23 RX ORDER — ONDANSETRON 2 MG/ML
4 INJECTION INTRAMUSCULAR; INTRAVENOUS EVERY 6 HOURS PRN
Status: DISCONTINUED | OUTPATIENT
Start: 2025-03-23 | End: 2025-03-24 | Stop reason: HOSPADM

## 2025-03-23 RX ORDER — OXYTOCIN/0.9 % SODIUM CHLORIDE 30/500 ML
125 PLASTIC BAG, INJECTION (ML) INTRAVENOUS ONCE AS NEEDED
Status: DISCONTINUED | OUTPATIENT
Start: 2025-03-23 | End: 2025-03-24 | Stop reason: HOSPADM

## 2025-03-23 RX ORDER — BISACODYL 10 MG
10 SUPPOSITORY, RECTAL RECTAL DAILY PRN
Status: DISCONTINUED | OUTPATIENT
Start: 2025-03-24 | End: 2025-03-24 | Stop reason: HOSPADM

## 2025-03-23 RX ORDER — SODIUM CHLORIDE 0.9 % (FLUSH) 0.9 %
1-10 SYRINGE (ML) INJECTION AS NEEDED
Status: DISCONTINUED | OUTPATIENT
Start: 2025-03-23 | End: 2025-03-24 | Stop reason: HOSPADM

## 2025-03-23 RX ADMIN — DOCUSATE SODIUM 100 MG: 100 CAPSULE, LIQUID FILLED ORAL at 10:15

## 2025-03-23 NOTE — PROGRESS NOTES
" [place]  Vaginal Delivery Progress Note    Subjective   Postpartum Day 1: Vaginal Delivery    The patient feels well.  Her pain is well controlled with oral pain medications.  Not yet ambulating much overnight but no issues up to the restroom.  Patient describes her bleeding as moderate lochia.    Breastfeeding: infant latching.    Objective     Vital Signs Range for the last 24 hours  Temperature: Temp:  [97.9 °F (36.6 °C)-98.3 °F (36.8 °C)] 97.9 °F (36.6 °C)   Temp Source: Temp src: Oral   BP: BP: (107-140)/(58-89) 121/75   Pulse: Heart Rate:  [70-91] 84   Respirations: Resp:  [16-18] 18   SPO2: SpO2:  [92 %-98 %] 94 %   O2 Amount (l/min):     O2 Devices Device (Oxygen Therapy): room air   Weight: Weight:  [85.7 kg (189 lb)] 85.7 kg (189 lb)     Admit Height:  Height: 154.9 cm (61\")      Physical Exam:  General:  no acute distress.  Abdomen: abdomen is soft without significant tenderness, masses, organomegaly or guarding.  Extremities: normal, atraumatic, no cyanosis, and trace edema.     Lab results reviewed:  Yes   Rubella:  No results found for: \"RUBELLAIGGIN\" Nurse Transcribed from prenatal record --    Rubella Antibodies, IgG   Date Value Ref Range Status   10/14/2024 9.47 Immune >0.99 index Final     Comment:                                     Non-immune       <0.90                                  Equivocal  0.90 - 0.99                                  Immune           >0.99       Rh Status:    RH type   Date Value Ref Range Status   03/22/2025 Positive  Final     Rh Factor   Date Value Ref Range Status   12/23/2024 Positive  Final     Comment:     Please note: Prior records for this patient's ABO / Rh type are not  available for additional verification.       Immunizations:   Immunization History   Administered Date(s) Administered    DT 11/07/2018    Influenza Seasonal Injectable 11/01/2012    PPD Test 09/26/2022    Td (TDVAX) 11/07/2018    Tdap 12/27/2012, 11/12/2013, 07/17/2014, 12/23/2024 "       Assessment & Plan       Pregnancy      Shivani Hewitt is Day 1  post-partum  Vaginal, Spontaneous  .      Plan:  Continue pad counts and encouraged ambulation. Anticipate discharge tomorrow afternoon. Planning PP lap BS ~6wks postpartum.      Bryson Norris MD  3/23/2025  10:37 EDT

## 2025-03-23 NOTE — PLAN OF CARE
Problem: Adult Inpatient Plan of Care  Goal: Plan of Care Review  Outcome: Progressing  Flowsheets (Taken 3/23/2025 0601)  Progress: improving  Outcome Evaluation: VSS, bleeding well controlled, uterus firm at umbilicus, no pain, breastfeeding infant, bonding well with infant  Plan of Care Reviewed With: patient     Problem: Adult Inpatient Plan of Care  Goal: Patient-Specific Goal (Individualized)  Outcome: Progressing  Flowsheets (Taken 3/23/2025 0601)  Patient/Family-Specific Goals (Include Timeframe): breastfeed infant every 2-3 hours   Goal Outcome Evaluation:

## 2025-03-23 NOTE — L&D DELIVERY NOTE
"[facility]  Vaginal Delivery Note    Delivery     Delivery: Vaginal, Spontaneous     1) 35 y.o.   2) IUP at 40w1d   YOB: 2024   Time of Birth: 2:10 AM     Gestational age 40w1d   Anesthesia:      Delivering clinician: Bryson Per   Forceps?   No   Vacuum? No    Shoulder dystocia present: No        Infant    Findings: child infant     Infant observations: Weight: No birth weight on file.  Length:   in  Observations/Comments:        Apgars: 8  @ 1 minute /    9  @ 5 minutes     Placenta, Cord, and Fluid    Placenta delivered  Spontaneous at  3/23/2025  2:18 AM    Cord:   present.   Nuchal Cord?  no   Cord blood obtained: Yes   Cord gases obtained:      Cord gas results: Venous:  No results found for: \"PHCVEN\", \"BECVEN\"    Arterial:  No results found for: \"PHCART\", \"BECART\"     Repair    Episiotomy: No   Lacerations: Yes  Laceration Information  Laceration Repaired?   Perineal: None     Periurethral: right Yes   Labial:       Sulcus:       Vaginal:       Cervical:         Suture used for repair: 3-0 Vicryl  Laceration Length for 3rd or 4th degree lacerations: n/a cm   Estimated Blood Loss:   200 mls.       Delivery narrative: Patient was admitted at term for induction of labor.  She was given penicillin for GBS prophylaxis and augmented with Pitocin and eventual artificial rupture membranes.  She eventually progressed to complete +3 station and pushed briefly for an uncomplicated vaginal delivery with the fetus delivering in the OA position. The umbilical cord was doubly-clamped and cut after 60 seconds delay. The placenta was subsequently delivered intact. A periurethral laceration was identified on the right side and reapproximated with 3-0 Vicryl in a running fashion. . Hemostasis was achieved with bimanual massage and IV Oxytocin  Mother to Mother Baby/Postpartum  in stable condition currently.  Baby to remains with mom  in stable condition currently.    Complications  none      Bryson" MD Jr  03/23/25  02:34 EDT

## 2025-03-23 NOTE — ANESTHESIA POSTPROCEDURE EVALUATION
Patient: Shivani Hewitt    Procedure Summary       Date: 03/22/25 Room / Location:     Anesthesia Start: 1403 Anesthesia Stop: 03/23/25 0210    Procedure: LABOR ANALGESIA Diagnosis:     Scheduled Providers:  Provider: Madisyn Okeefe MD    Anesthesia Type: epidural ASA Status: 2            Anesthesia Type: epidural    Vitals  Vitals Value Taken Time   /76 03/23/25 05:02   Temp 97.9 °F (36.6 °C) 03/23/25 02:30   Pulse 84 03/23/25 05:02   Resp 18 03/23/25 04:00   SpO2 94 % 03/23/25 04:30   Vitals shown include unfiled device data.        Post Anesthesia Care and Evaluation    Patient location during evaluation: bedside  Patient participation: complete - patient participated  Level of consciousness: awake and alert  Pain management: satisfactory to patient    Airway patency: patent  Anesthetic complications: No anesthetic complications  PONV Status: none  Cardiovascular status: acceptable  Respiratory status: acceptable  Hydration status: acceptable

## 2025-03-23 NOTE — PLAN OF CARE
Goal Outcome Evaluation:  Plan of Care Reviewed With: significant other        Progress: improving  Outcome Evaluation: VSS. Fundus firm midline at U with scant rubra. Pt has reported mild soreness in perineal area throughout the day, but states well tolerated with motrin PRN. Breastfeeding around-the-clock. Education given on infant feeding/hunger cues and tips for a better latch. Bonding well with baby.       Problem: Adult Inpatient Plan of Care  Goal: Plan of Care Review  Outcome: Progressing  Flowsheets  Taken 3/23/2025 1759 by Gia Goncalves RN  Progress: improving  Outcome Evaluation: VSS. Fundus firm midline at U with scant rubra. Pt has reported mild soreness in perineal area throughout the day, but states well tolerated with motrin PRN. Breastfeeding around-the-clock. Education given on infant feeding/hunger cues and tips for a better latch. Bonding well with baby.  Taken 3/23/2025 0601 by Amparo Salazar RN  Plan of Care Reviewed With: patient  Goal: Patient-Specific Goal (Individualized)  Outcome: Progressing  Flowsheets  Taken 3/23/2025 0601 by Amparo Salazar RN  Patient/Family-Specific Goals (Include Timeframe): breastfeed infant every 2-3 hours  Taken 3/22/2025 1848 by Gia Goncalves RN  Individualized Care Needs: Pre-E precautions  Goal: Absence of Hospital-Acquired Illness or Injury  Outcome: Progressing  Intervention: Identify and Manage Fall Risk  Recent Flowsheet Documentation  Taken 3/23/2025 1600 by Gia Goncalves RN  Safety Promotion/Fall Prevention: safety round/check completed  Taken 3/23/2025 1000 by Gia Goncalves RN  Safety Promotion/Fall Prevention: safety round/check completed  Intervention: Prevent Skin Injury  Recent Flowsheet Documentation  Taken 3/23/2025 1600 by Gia Goncalves RN  Body Position: position changed independently  Goal: Optimal Comfort and Wellbeing  Outcome: Progressing  Intervention: Monitor Pain and Promote Comfort  Recent Flowsheet Documentation  Taken 3/23/2025  1600 by Gia Goncalves RN  Pain Management Interventions:   medication offered but refused   relaxation techniques promoted  Intervention: Provide Person-Centered Care  Recent Flowsheet Documentation  Taken 3/23/2025 1600 by Gia Goncalves RN  Trust Relationship/Rapport:   care explained   choices provided  Taken 3/23/2025 1000 by Gia Goncalves RN  Trust Relationship/Rapport:   care explained   choices provided  Goal: Readiness for Transition of Care  Outcome: Progressing     Problem: Breastfeeding  Goal: Effective Breastfeeding  Outcome: Progressing     Problem: Postpartum (Vaginal Delivery)  Goal: Successful Parent Role Transition  Outcome: Progressing  Goal: Hemostasis  Outcome: Progressing  Goal: Absence of Infection Signs and Symptoms  Outcome: Progressing  Goal: Anesthesia/Sedation Recovery  Outcome: Progressing  Intervention: Optimize Anesthesia Recovery  Recent Flowsheet Documentation  Taken 3/23/2025 1600 by Gia Goncalves RN  Safety Promotion/Fall Prevention: safety round/check completed  Taken 3/23/2025 1000 by Gia Goncalves RN  Safety Promotion/Fall Prevention: safety round/check completed  Goal: Optimal Pain Control and Function  Outcome: Progressing  Intervention: Prevent or Manage Pain  Recent Flowsheet Documentation  Taken 3/23/2025 1600 by Gia Goncalves RN  Pain Management Interventions:   medication offered but refused   relaxation techniques promoted  Taken 3/23/2025 1000 by Gia Goncalves RN  Perineal Care: perineum cleansed  Goal: Effective Urinary Elimination  Outcome: Progressing     Problem: Skin Injury Risk Increased  Goal: Skin Health and Integrity  Outcome: Progressing  Intervention: Optimize Skin Protection  Recent Flowsheet Documentation  Taken 3/23/2025 1000 by Gia Goncalves RN  Activity Management: ambulated to bathroom

## 2025-03-23 NOTE — PROGRESS NOTES
Patient: Shivani Hewitt  * No surgery found *  Anesthesia type: No value filed.    Patient location: Labor and Delivery  Last vitals:   Vitals:    03/23/25 1020   BP: 121/75   Pulse:    Resp:    Temp:    SpO2:      Level of consciousness: awake, alert, and oriented    Post-anesthesia pain: adequate analgesia  Airway patency: patent  Respiratory: unassisted  Cardiovascular: stable and blood pressure at baseline  Hydration: euvolemic    Anesthetic complications: no

## 2025-03-24 VITALS
BODY MASS INDEX: 35.68 KG/M2 | RESPIRATION RATE: 18 BRPM | SYSTOLIC BLOOD PRESSURE: 124 MMHG | HEIGHT: 61 IN | HEART RATE: 77 BPM | DIASTOLIC BLOOD PRESSURE: 77 MMHG | WEIGHT: 189 LBS | TEMPERATURE: 97.5 F | OXYGEN SATURATION: 96 %

## 2025-03-24 PROBLEM — Z34.90 PREGNANCY: Status: RESOLVED | Noted: 2025-03-20 | Resolved: 2025-03-24

## 2025-03-24 LAB
BASOPHILS # BLD AUTO: 0.01 10*3/MM3 (ref 0–0.2)
BASOPHILS NFR BLD AUTO: 0.1 % (ref 0–1.5)
DEPRECATED RDW RBC AUTO: 51.2 FL (ref 37–54)
EOSINOPHIL # BLD AUTO: 0.11 10*3/MM3 (ref 0–0.4)
EOSINOPHIL NFR BLD AUTO: 1.1 % (ref 0.3–6.2)
ERYTHROCYTE [DISTWIDTH] IN BLOOD BY AUTOMATED COUNT: 14.7 % (ref 12.3–15.4)
HCT VFR BLD AUTO: 35.7 % (ref 34–46.6)
HGB BLD-MCNC: 11.3 G/DL (ref 12–15.9)
IMM GRANULOCYTES # BLD AUTO: 0.05 10*3/MM3 (ref 0–0.05)
IMM GRANULOCYTES NFR BLD AUTO: 0.5 % (ref 0–0.5)
LYMPHOCYTES # BLD AUTO: 2.53 10*3/MM3 (ref 0.7–3.1)
LYMPHOCYTES NFR BLD AUTO: 25 % (ref 19.6–45.3)
MCH RBC QN AUTO: 30.1 PG (ref 26.6–33)
MCHC RBC AUTO-ENTMCNC: 31.7 G/DL (ref 31.5–35.7)
MCV RBC AUTO: 95.2 FL (ref 79–97)
MONOCYTES # BLD AUTO: 0.61 10*3/MM3 (ref 0.1–0.9)
MONOCYTES NFR BLD AUTO: 6 % (ref 5–12)
NEUTROPHILS NFR BLD AUTO: 6.8 10*3/MM3 (ref 1.7–7)
NEUTROPHILS NFR BLD AUTO: 67.3 % (ref 42.7–76)
PLATELET # BLD AUTO: 157 10*3/MM3 (ref 140–450)
PMV BLD AUTO: 12.5 FL (ref 6–12)
RBC # BLD AUTO: 3.75 10*6/MM3 (ref 3.77–5.28)
WBC NRBC COR # BLD AUTO: 10.11 10*3/MM3 (ref 3.4–10.8)

## 2025-03-24 PROCEDURE — 0503F POSTPARTUM CARE VISIT: CPT | Performed by: NURSE PRACTITIONER

## 2025-03-24 PROCEDURE — 85025 COMPLETE CBC W/AUTO DIFF WBC: CPT | Performed by: STUDENT IN AN ORGANIZED HEALTH CARE EDUCATION/TRAINING PROGRAM

## 2025-03-24 RX ORDER — OXYCODONE AND ACETAMINOPHEN 5; 325 MG/1; MG/1
1 TABLET ORAL EVERY 4 HOURS PRN
Qty: 12 TABLET | Refills: 0 | Status: SHIPPED | OUTPATIENT
Start: 2025-03-24 | End: 2025-04-03

## 2025-03-24 RX ORDER — ACETAMINOPHEN 325 MG/1
650 TABLET ORAL EVERY 6 HOURS PRN
Qty: 60 TABLET | Refills: 0 | Status: SHIPPED | OUTPATIENT
Start: 2025-03-24

## 2025-03-24 RX ORDER — IBUPROFEN 600 MG/1
600 TABLET, FILM COATED ORAL EVERY 6 HOURS PRN
Qty: 60 TABLET | Refills: 0 | Status: SHIPPED | OUTPATIENT
Start: 2025-03-24

## 2025-03-24 RX ORDER — OXYCODONE AND ACETAMINOPHEN 5; 325 MG/1; MG/1
1 TABLET ORAL EVERY 6 HOURS PRN
Qty: 12 TABLET | Refills: 0 | Status: SHIPPED | OUTPATIENT
Start: 2025-03-24 | End: 2025-03-24 | Stop reason: HOSPADM

## 2025-03-24 RX ORDER — OXYCODONE AND ACETAMINOPHEN 5; 325 MG/1; MG/1
1 TABLET ORAL EVERY 6 HOURS PRN
Qty: 12 TABLET | Refills: 0 | Status: SHIPPED | OUTPATIENT
Start: 2025-03-24 | End: 2025-03-24

## 2025-03-24 RX ORDER — PSEUDOEPHEDRINE HCL 30 MG
100 TABLET ORAL 2 TIMES DAILY
Qty: 60 CAPSULE | Refills: 0 | Status: SHIPPED | OUTPATIENT
Start: 2025-03-24

## 2025-03-24 RX ADMIN — Medication 1 APPLICATION: at 06:25

## 2025-03-24 RX ADMIN — WITCH HAZEL 1 PAD: 500 SOLUTION RECTAL; TOPICAL at 06:26

## 2025-03-24 RX ADMIN — IBUPROFEN 600 MG: 600 TABLET, FILM COATED ORAL at 06:26

## 2025-03-24 RX ADMIN — DOCUSATE SODIUM 100 MG: 100 CAPSULE, LIQUID FILLED ORAL at 08:29

## 2025-03-24 NOTE — DISCHARGE SUMMARY
"Obstetrical Discharge Form    Primary OB Clinician: LIBORIO      Gestational Age: 40w1d    Antepartum complications: GBS+, mild asthma    Date of Delivery:  3/23/2025    Delivered By: Bryson Per    Delivery Type: spontaneous vaginal delivery    Tubal Ligation: plan PP    Baby: Liveborn female, Apgars 8/9, weight 8 #, 15.8 oz     Anesthesia: epidural    Intrapartum complications: None    Laceration: periurethral- right; repaired      Feeding method: breast/bottle      Discharge Date: 3/24/2025; Discharge Time: 09:40 EDT    /77 (BP Location: Left arm, Patient Position: Sitting)   Pulse 77   Temp 97.5 °F (36.4 °C) (Oral)   Resp 18   Ht 154.9 cm (61\")   Wt 85.7 kg (189 lb)   LMP 06/15/2024 (Approximate)   SpO2 96%   Breastfeeding Yes   BMI 35.71 kg/m²      Abd: soft, fundus firm  Ext: small edema, no pain, neg Curt's  Results from last 7 days   Lab Units 25  0730   HEMOGLOBIN g/dL 11.3*       Impression: Post-partum Day #2     Plan:    1) Post-partum Day #2 s/p     2) Girl- breast/bottle    3) Contraception- desires BTL PP    4) PP depression- reviewed warning signs    Patient given written instruction sheet.  Follow-up appointment with [facility] in 2 weeks.    Araceli Starkey, PRAVIN  09:40 EDT  3/24/2025    Discharge time was less than 30 minutes   "

## 2025-03-24 NOTE — PLAN OF CARE
Problem: Adult Inpatient Plan of Care  Goal: Plan of Care Review  Outcome: Progressing  Flowsheets  Taken 3/24/2025 0419 by Loulou Tracey RN  Progress: improving  Outcome Evaluation: vitals stable, fundus firm, lochia scant, denies pain, breastfeeding/bottlefeeding and bonding well with infant.  Taken 3/23/2025 0601 by Amparo Salazar RN  Plan of Care Reviewed With: patient  Goal: Patient-Specific Goal (Individualized)  Outcome: Progressing  Flowsheets (Taken 3/24/2025 0419)  Patient/Family-Specific Goals (Include Timeframe): breastfeeding infant every 2-3 hours  Individualized Care Needs: Pre-E precautions  Anxieties, Fears or Concerns: denies concerns or fears  Goal: Absence of Hospital-Acquired Illness or Injury  Outcome: Progressing  Intervention: Identify and Manage Fall Risk  Recent Flowsheet Documentation  Taken 3/24/2025 0400 by Loulou Tracey RN  Safety Promotion/Fall Prevention: safety round/check completed  Taken 3/24/2025 0230 by Loulou Tracey RN  Safety Promotion/Fall Prevention: safety round/check completed  Taken 3/23/2025 2230 by Loulou Tracey RN  Safety Promotion/Fall Prevention: safety round/check completed  Taken 3/23/2025 1910 by Loulou Tracey RN  Safety Promotion/Fall Prevention: safety round/check completed  Goal: Optimal Comfort and Wellbeing  Outcome: Progressing  Intervention: Monitor Pain and Promote Comfort  Recent Flowsheet Documentation  Taken 3/23/2025 2230 by Loulou Tracey RN  Pain Management Interventions: medication offered but refused  Taken 3/23/2025 1910 by oLulou Tracey RN  Pain Management Interventions: medication offered but refused  Intervention: Provide Person-Centered Care  Recent Flowsheet Documentation  Taken 3/23/2025 1910 by Loulou Tracey RN  Trust Relationship/Rapport:   care explained   choices provided   emotional support provided   empathic listening provided   questions answered   questions encouraged   reassurance  provided   thoughts/feelings acknowledged  Goal: Readiness for Transition of Care  Outcome: Progressing  Intervention: Mutually Develop Transition Plan  Recent Flowsheet Documentation  Taken 3/24/2025 0029 by Loulou Tracey RN  Equipment Currently Used at Home: none     Problem: Breastfeeding  Goal: Effective Breastfeeding  Outcome: Progressing     Problem: Postpartum (Vaginal Delivery)  Goal: Successful Parent Role Transition  Outcome: Progressing  Goal: Hemostasis  Outcome: Progressing  Goal: Absence of Infection Signs and Symptoms  Outcome: Progressing  Goal: Anesthesia/Sedation Recovery  Outcome: Progressing  Intervention: Optimize Anesthesia Recovery  Recent Flowsheet Documentation  Taken 3/24/2025 0400 by Loulou Tracey RN  Safety Promotion/Fall Prevention: safety round/check completed  Taken 3/24/2025 0230 by Loulou Tracey RN  Safety Promotion/Fall Prevention: safety round/check completed  Taken 3/23/2025 2230 by Loulou Tracey RN  Safety Promotion/Fall Prevention: safety round/check completed  Taken 3/23/2025 1910 by Loulou Tracey RN  Safety Promotion/Fall Prevention: safety round/check completed  Goal: Optimal Pain Control and Function  Outcome: Progressing  Intervention: Prevent or Manage Pain  Recent Flowsheet Documentation  Taken 3/23/2025 2230 by Loulou Tracey RN  Perineal Care:   perineum cleansed   perineal hygiene encouraged   perineal spray bottle/warm water use encouraged  Pain Management Interventions: medication offered but refused  Taken 3/23/2025 1910 by Loulou Tracey RN  Pain Management Interventions: medication offered but refused  Goal: Effective Urinary Elimination  Outcome: Progressing     Problem: Skin Injury Risk Increased  Goal: Skin Health and Integrity  Outcome: Progressing   Goal Outcome Evaluation:           Progress: improving  Outcome Evaluation: vitals stable, fundus firm, lochia scant, denies pain, breastfeeding/bottlefeeding and bonding  well with infant.

## 2025-03-24 NOTE — PROGRESS NOTES
Patient: Shivani Hewitt  * No surgery found *  Anesthesia type: No value filed.    Patient location: Labor and Delivery  Last vitals:   Vitals:    03/24/25 0815   BP: 124/77   Pulse: 77   Resp: 18   Temp: 97.5 °F (36.4 °C)   SpO2: 96%     Level of consciousness: awake, alert, and oriented    Post-anesthesia pain: adequate analgesia  Airway patency: patent  Respiratory: unassisted  Cardiovascular: stable and blood pressure at baseline  Hydration: euvolemic    Anesthetic complications: no  Patient states soreness in lower back and asked if related to anesthesia. Expressed to patient it is more likely muscle inflammation from labor and delivering baby, however, if pain remains and is focal at site of entry to follow up with anesthesia by letting her OB know. Patient verbalizes understanding and agreement.

## 2025-03-25 ENCOUNTER — MATERNAL SCREENING (OUTPATIENT)
Dept: CALL CENTER | Facility: HOSPITAL | Age: 35
End: 2025-03-25
Payer: COMMERCIAL

## 2025-03-25 NOTE — OUTREACH NOTE
Maternal Screening Survey      Flowsheet Row Responses   Eligibility Eligible   Prep survey completed? Yes   Facility patient discharged from? [facility] - Registered Nurse

## 2025-03-25 NOTE — CASE MANAGEMENT/SOCIAL WORK
Case Management Discharge Note      Final Note: dc home         Selected Continued Care - Discharged on 3/24/2025 Admission date: 3/22/2025 - Discharge disposition: Home or Self Care      Destination    No services have been selected for the patient.                Durable Medical Equipment    No services have been selected for the patient.                Dialysis/Infusion    No services have been selected for the patient.                Home Medical Care    No services have been selected for the patient.                Therapy    No services have been selected for the patient.                Community Resources    No services have been selected for the patient.                Community & DME    No services have been selected for the patient.                         Final Discharge Disposition Code: 01 - home or self-care

## 2025-03-26 ENCOUNTER — TELEPHONE (OUTPATIENT)
Dept: OBSTETRICS AND GYNECOLOGY | Facility: CLINIC | Age: 35
End: 2025-03-26
Payer: COMMERCIAL

## 2025-03-26 NOTE — TELEPHONE ENCOUNTER
Pt called reporting a painful hemorrhoid after having first BM since delivering on 03/23/25. Advised pt to continue using tucks pads, cream and start using a sitz bath. Pt to  sitz bath from office.

## 2025-04-02 ENCOUNTER — MATERNAL SCREENING (OUTPATIENT)
Dept: CALL CENTER | Facility: HOSPITAL | Age: 35
End: 2025-04-02
Payer: COMMERCIAL

## 2025-04-02 NOTE — OUTREACH NOTE
Maternal Screening Survey      Flowsheet Row Responses   Facility patient discharged from? [facility]   Attempt successful? No   Unsuccessful attempts Attempt 1              Betty Marie Registered Nurse

## 2025-04-02 NOTE — OUTREACH NOTE
Maternal Screening Survey      Flowsheet Row Responses   Facility patient discharged from? [facility]   Attempt successful? No   Unsuccessful attempts Attempt 2              Betty ALEXANDER - Registered Nurse           needs device and assist

## 2025-04-03 ENCOUNTER — MATERNAL SCREENING (OUTPATIENT)
Dept: CALL CENTER | Facility: HOSPITAL | Age: 35
End: 2025-04-03
Payer: COMMERCIAL

## 2025-04-03 NOTE — OUTREACH NOTE
Maternal Screening Survey      Flowsheet Row Responses   Facility patient discharged from? [facility]   Attempt successful? No   Unsuccessful attempts Attempt 3   Revoke Unable to reach              Janina HART - Registered Nurse

## 2025-04-11 ENCOUNTER — PREP FOR SURGERY (OUTPATIENT)
Dept: OTHER | Facility: HOSPITAL | Age: 35
End: 2025-04-11
Payer: COMMERCIAL

## 2025-04-11 ENCOUNTER — POSTPARTUM VISIT (OUTPATIENT)
Dept: OBSTETRICS AND GYNECOLOGY | Facility: CLINIC | Age: 35
End: 2025-04-11
Payer: COMMERCIAL

## 2025-04-11 VITALS — SYSTOLIC BLOOD PRESSURE: 130 MMHG | BODY MASS INDEX: 30.61 KG/M2 | DIASTOLIC BLOOD PRESSURE: 82 MMHG | WEIGHT: 162 LBS

## 2025-04-11 DIAGNOSIS — Z30.2 REQUEST FOR STERILIZATION: Primary | ICD-10-CM

## 2025-04-11 DIAGNOSIS — Z30.2 REQUEST FOR STERILIZATION: ICD-10-CM

## 2025-04-11 RX ORDER — SODIUM CHLORIDE 0.9 % (FLUSH) 0.9 %
10 SYRINGE (ML) INJECTION AS NEEDED
OUTPATIENT
Start: 2025-04-11

## 2025-04-11 RX ORDER — SODIUM CHLORIDE 0.9 % (FLUSH) 0.9 %
10 SYRINGE (ML) INJECTION EVERY 12 HOURS SCHEDULED
OUTPATIENT
Start: 2025-04-11

## 2025-04-11 RX ORDER — SODIUM CHLORIDE 9 MG/ML
40 INJECTION, SOLUTION INTRAVENOUS AS NEEDED
OUTPATIENT
Start: 2025-04-11

## 2025-04-11 NOTE — PROGRESS NOTES
"Chief Complaint  Postpartum Care    Subjective        Shivani Hewitt presents to [facility]  Presenting for postpartum follow-up.  On 3/23/25 she underwent an uncomplicated vaginal delivery.  Her hospital postpartum course was uncomplicated as has her time since hospital discharge.  She denies any current issues. Minimal vaginal bleeding and no abnormal vaginal discharge. Pain is controlled and no fevers.  Mood is doing well.  Baby doing well. Breast-feeding without issues.     Objective   Vital Signs:  /82   Wt 73.5 kg (162 lb)   BMI 30.61 kg/m²   Estimated body mass index is 30.61 kg/m² as calculated from the following:    Height as of 3/22/25: 154.9 cm (61\").    Weight as of this encounter: 73.5 kg (162 lb).      Physical Exam  Constitutional:       General: She is not in acute distress.     Appearance: Normal appearance. She is not ill-appearing.   Eyes:      Pupils: Pupils are equal, round, and reactive to light.   Cardiovascular:      Rate and Rhythm: Normal rate.   Pulmonary:      Effort: Pulmonary effort is normal. No respiratory distress.   Abdominal:      General: Abdomen is flat. There is no distension.   Neurological:      General: No focal deficit present.      Mental Status: She is alert.   Psychiatric:         Mood and Affect: Mood normal.         Thought Content: Thought content normal.        Result Review :           Assessment and Plan   Diagnoses and all orders for this visit:    1. Postpartum care following vaginal delivery (Primary)    2. Request for sterilization      Desires lap BS, counseled today, will schedule for 4-6 weeks from now.  Risks, benefits and alternatives of the procedure were discussed, including , but not limited to: infection, bleeding, transfusion, injury to adjacent structures, finding of unexpected malignancy, reoperation, recurrent symptoms, laparotomy, possible non diagnostic findings, thromboembolic events, aneasthesic complications and " "death. Pre/intra/postop course was reviewed and all questions answered. Patient was encouraged to call for any additional questions she might have in the future.  We also discussed risks specific to this procedure, including risk of failure, risk of ectopic gestation and risk of regret, especially in patients younger than 28 with 3 or fewer children. She was advised to take a pregnancy test for any missed menses and to report to the office immediately if she has a positive pregnancy test. We also discussed the different methods of sterilization including cautery, partial salpingectomy, bilateral salpingectomy and she desires bilateral salpingectomy.  Bilateral salpingectomy may decrease one's lifetime risk of ovarian cancer, however, it is not \"reversible\" and if a future pregnancy is required one would need IVF.          Follow Up   Return in about 4 weeks (around 5/9/2025) for Postpartum visit.  Patient was given instructions and counseling regarding her condition or for health maintenance advice. Please see specific information pulled into the AVS if appropriate.     Bryson Norris MD  4/11/2025   11:46 EDT    "

## 2025-05-08 ENCOUNTER — POSTPARTUM VISIT (OUTPATIENT)
Dept: OBSTETRICS AND GYNECOLOGY | Facility: CLINIC | Age: 35
End: 2025-05-08
Payer: COMMERCIAL

## 2025-05-08 VITALS — BODY MASS INDEX: 30.78 KG/M2 | WEIGHT: 163 LBS | HEIGHT: 61 IN

## 2025-05-08 DIAGNOSIS — Z30.2 REQUEST FOR STERILIZATION: ICD-10-CM

## 2025-05-08 NOTE — PROGRESS NOTES
"Chief Complaint  Postpartum Care    Subjective        Shivani Hewitt presents to [facility]  Presenting for postpartum follow-up.  On 3/23/25 she underwent an uncomplicated vaginal delivery.  Her hospital postpartum course was uncomplicated as has her time since hospital discharge.  She denies any current issues. Bleeding has stopped and no abnormal vaginal discharge. Pain is controlled and no fevers.  Mood is doing well.  Baby doing well. Breast-feeding without issues.   Scheduled for lap BS 5/19/25.    Objective   Vital Signs:  Ht 154.9 cm (61\")   Wt 73.9 kg (163 lb)   BMI 30.80 kg/m²   Estimated body mass index is 30.8 kg/m² as calculated from the following:    Height as of this encounter: 154.9 cm (61\").    Weight as of this encounter: 73.9 kg (163 lb).      Physical Exam  Constitutional:       General: She is not in acute distress.     Appearance: Normal appearance. She is not ill-appearing.   Eyes:      Pupils: Pupils are equal, round, and reactive to light.   Cardiovascular:      Rate and Rhythm: Normal rate.   Pulmonary:      Effort: Pulmonary effort is normal. No respiratory distress.   Abdominal:      General: Abdomen is flat. There is no distension.   Neurological:      General: No focal deficit present.      Mental Status: She is alert.   Psychiatric:         Mood and Affect: Mood normal.         Thought Content: Thought content normal.        Result Review :           Assessment and Plan   Diagnoses and all orders for this visit:    1. Postpartum care following vaginal delivery (Primary)    2. Request for sterilization             Follow Up   Patient was given instructions and counseling regarding her condition or for health maintenance advice. Please see specific information pulled into the AVS if appropriate.     Bryson Norris MD  5/8/2025   13:13 EDT    "

## 2025-05-14 NOTE — PRE-PROCEDURE INSTRUCTIONS
History updated by phone w/patient. Preop instructions reviewed and instructed clears until 2hrs prior to arrival time dos, voiced understanding.

## 2025-05-16 ENCOUNTER — ANESTHESIA EVENT (OUTPATIENT)
Dept: PERIOP | Facility: HOSPITAL | Age: 35
End: 2025-05-16
Payer: COMMERCIAL

## 2025-05-19 ENCOUNTER — ANESTHESIA (OUTPATIENT)
Dept: PERIOP | Facility: HOSPITAL | Age: 35
End: 2025-05-19
Payer: COMMERCIAL

## 2025-05-19 ENCOUNTER — HOSPITAL ENCOUNTER (OUTPATIENT)
Facility: HOSPITAL | Age: 35
Setting detail: HOSPITAL OUTPATIENT SURGERY
Discharge: HOME | End: 2025-05-19
Attending: STUDENT IN AN ORGANIZED HEALTH CARE EDUCATION/TRAINING PROGRAM | Admitting: STUDENT IN AN ORGANIZED HEALTH CARE EDUCATION/TRAINING PROGRAM
Payer: COMMERCIAL

## 2025-05-19 VITALS
SYSTOLIC BLOOD PRESSURE: 100 MMHG | BODY MASS INDEX: 29.7 KG/M2 | RESPIRATION RATE: 17 BRPM | HEART RATE: 80 BPM | TEMPERATURE: 97 F | WEIGHT: 157.2 LBS | OXYGEN SATURATION: 90 % | DIASTOLIC BLOOD PRESSURE: 69 MMHG

## 2025-05-19 DIAGNOSIS — Z30.2 REQUEST FOR STERILIZATION: ICD-10-CM

## 2025-05-19 DIAGNOSIS — Z98.890 S/P LAPAROSCOPY: Primary | ICD-10-CM

## 2025-05-19 LAB — HCG SERPL QL: NEGATIVE

## 2025-05-19 PROCEDURE — 25010000002 LIDOCAINE 2% SOLUTION

## 2025-05-19 PROCEDURE — 25810000003 LACTATED RINGERS PER 1000 ML

## 2025-05-19 PROCEDURE — 25010000002 PROPOFOL 200 MG/20ML EMULSION

## 2025-05-19 PROCEDURE — 25010000002 CEFAZOLIN PER 500 MG: Performed by: STUDENT IN AN ORGANIZED HEALTH CARE EDUCATION/TRAINING PROGRAM

## 2025-05-19 PROCEDURE — 25010000002 FENTANYL CITRATE (PF) 50 MCG/ML SOLUTION

## 2025-05-19 PROCEDURE — 25010000002 SUCCINYLCHOLINE PER 20 MG

## 2025-05-19 PROCEDURE — 58661 LAPAROSCOPY REMOVE ADNEXA: CPT | Performed by: SPECIALIST/TECHNOLOGIST, OTHER

## 2025-05-19 PROCEDURE — 25010000002 ONDANSETRON PER 1 MG

## 2025-05-19 PROCEDURE — 25010000002 SUGAMMADEX 200 MG/2ML SOLUTION

## 2025-05-19 PROCEDURE — 84703 CHORIONIC GONADOTROPIN ASSAY: CPT

## 2025-05-19 PROCEDURE — 25010000002 FAMOTIDINE 10 MG/ML SOLUTION

## 2025-05-19 PROCEDURE — 25010000002 DEXAMETHASONE PER 1 MG

## 2025-05-19 PROCEDURE — 88302 TISSUE EXAM BY PATHOLOGIST: CPT | Performed by: STUDENT IN AN ORGANIZED HEALTH CARE EDUCATION/TRAINING PROGRAM

## 2025-05-19 PROCEDURE — S0260 H&P FOR SURGERY: HCPCS | Performed by: STUDENT IN AN ORGANIZED HEALTH CARE EDUCATION/TRAINING PROGRAM

## 2025-05-19 PROCEDURE — 58661 LAPAROSCOPY REMOVE ADNEXA: CPT | Performed by: STUDENT IN AN ORGANIZED HEALTH CARE EDUCATION/TRAINING PROGRAM

## 2025-05-19 PROCEDURE — 25010000002 MIDAZOLAM PER 1MG

## 2025-05-19 RX ORDER — DEXAMETHASONE SODIUM PHOSPHATE 4 MG/ML
8 INJECTION, SOLUTION INTRA-ARTICULAR; INTRALESIONAL; INTRAMUSCULAR; INTRAVENOUS; SOFT TISSUE ONCE AS NEEDED
Status: COMPLETED | OUTPATIENT
Start: 2025-05-19 | End: 2025-05-19

## 2025-05-19 RX ORDER — SODIUM CHLORIDE 9 MG/ML
40 INJECTION, SOLUTION INTRAVENOUS AS NEEDED
Status: DISCONTINUED | OUTPATIENT
Start: 2025-05-19 | End: 2025-05-19 | Stop reason: HOSPADM

## 2025-05-19 RX ORDER — SODIUM CHLORIDE, SODIUM LACTATE, POTASSIUM CHLORIDE, CALCIUM CHLORIDE 600; 310; 30; 20 MG/100ML; MG/100ML; MG/100ML; MG/100ML
9 INJECTION, SOLUTION INTRAVENOUS CONTINUOUS
Status: DISCONTINUED | OUTPATIENT
Start: 2025-05-19 | End: 2025-05-19 | Stop reason: HOSPADM

## 2025-05-19 RX ORDER — ONDANSETRON 2 MG/ML
4 INJECTION INTRAMUSCULAR; INTRAVENOUS ONCE AS NEEDED
Status: DISCONTINUED | OUTPATIENT
Start: 2025-05-19 | End: 2025-05-19 | Stop reason: HOSPADM

## 2025-05-19 RX ORDER — ROCURONIUM BROMIDE 10 MG/ML
INJECTION, SOLUTION INTRAVENOUS AS NEEDED
Status: DISCONTINUED | OUTPATIENT
Start: 2025-05-19 | End: 2025-05-19 | Stop reason: SURG

## 2025-05-19 RX ORDER — OXYCODONE HYDROCHLORIDE 5 MG/1
5 TABLET ORAL EVERY 4 HOURS PRN
Qty: 15 TABLET | Refills: 0 | Status: SHIPPED | OUTPATIENT
Start: 2025-05-19

## 2025-05-19 RX ORDER — ONDANSETRON 2 MG/ML
4 INJECTION INTRAMUSCULAR; INTRAVENOUS ONCE AS NEEDED
Status: COMPLETED | OUTPATIENT
Start: 2025-05-19 | End: 2025-05-19

## 2025-05-19 RX ORDER — PROPOFOL 10 MG/ML
INJECTION, EMULSION INTRAVENOUS AS NEEDED
Status: DISCONTINUED | OUTPATIENT
Start: 2025-05-19 | End: 2025-05-19 | Stop reason: SURG

## 2025-05-19 RX ORDER — MIDAZOLAM HYDROCHLORIDE 2 MG/2ML
1 INJECTION, SOLUTION INTRAMUSCULAR; INTRAVENOUS
Status: DISCONTINUED | OUTPATIENT
Start: 2025-05-19 | End: 2025-05-19 | Stop reason: HOSPADM

## 2025-05-19 RX ORDER — LIDOCAINE HYDROCHLORIDE 20 MG/ML
INJECTION, SOLUTION INFILTRATION; PERINEURAL AS NEEDED
Status: DISCONTINUED | OUTPATIENT
Start: 2025-05-19 | End: 2025-05-19 | Stop reason: SURG

## 2025-05-19 RX ORDER — KETAMINE HYDROCHLORIDE 10 MG/ML
INJECTION, SOLUTION INTRAMUSCULAR; INTRAVENOUS AS NEEDED
Status: DISCONTINUED | OUTPATIENT
Start: 2025-05-19 | End: 2025-05-19 | Stop reason: SURG

## 2025-05-19 RX ORDER — ACETAMINOPHEN 500 MG
1000 TABLET ORAL EVERY 6 HOURS PRN
Qty: 60 TABLET | Refills: 0 | Status: SHIPPED | OUTPATIENT
Start: 2025-05-19

## 2025-05-19 RX ORDER — SODIUM CHLORIDE 0.9 % (FLUSH) 0.9 %
10 SYRINGE (ML) INJECTION AS NEEDED
Status: DISCONTINUED | OUTPATIENT
Start: 2025-05-19 | End: 2025-05-19 | Stop reason: HOSPADM

## 2025-05-19 RX ORDER — SODIUM CHLORIDE, SODIUM LACTATE, POTASSIUM CHLORIDE, CALCIUM CHLORIDE 600; 310; 30; 20 MG/100ML; MG/100ML; MG/100ML; MG/100ML
100 INJECTION, SOLUTION INTRAVENOUS ONCE
Status: DISCONTINUED | OUTPATIENT
Start: 2025-05-19 | End: 2025-05-19 | Stop reason: HOSPADM

## 2025-05-19 RX ORDER — SODIUM CHLORIDE 0.9 % (FLUSH) 0.9 %
10 SYRINGE (ML) INJECTION EVERY 12 HOURS SCHEDULED
Status: DISCONTINUED | OUTPATIENT
Start: 2025-05-19 | End: 2025-05-19 | Stop reason: HOSPADM

## 2025-05-19 RX ORDER — FAMOTIDINE 10 MG/ML
20 INJECTION, SOLUTION INTRAVENOUS
Status: COMPLETED | OUTPATIENT
Start: 2025-05-19 | End: 2025-05-19

## 2025-05-19 RX ORDER — SUCCINYLCHOLINE CHLORIDE 20 MG/ML
INJECTION INTRAMUSCULAR; INTRAVENOUS AS NEEDED
Status: DISCONTINUED | OUTPATIENT
Start: 2025-05-19 | End: 2025-05-19 | Stop reason: SURG

## 2025-05-19 RX ORDER — IBUPROFEN 600 MG/1
600 TABLET, FILM COATED ORAL EVERY 6 HOURS PRN
Qty: 60 TABLET | Refills: 0 | Status: SHIPPED | OUTPATIENT
Start: 2025-05-19

## 2025-05-19 RX ORDER — ONDANSETRON 4 MG/1
4 TABLET, FILM COATED ORAL DAILY PRN
Qty: 30 TABLET | Refills: 1 | Status: SHIPPED | OUTPATIENT
Start: 2025-05-19 | End: 2026-05-19

## 2025-05-19 RX ORDER — FENTANYL CITRATE 50 UG/ML
INJECTION, SOLUTION INTRAMUSCULAR; INTRAVENOUS AS NEEDED
Status: DISCONTINUED | OUTPATIENT
Start: 2025-05-19 | End: 2025-05-19 | Stop reason: SURG

## 2025-05-19 RX ORDER — OXYCODONE AND ACETAMINOPHEN 5; 325 MG/1; MG/1
1 TABLET ORAL ONCE AS NEEDED
Status: DISCONTINUED | OUTPATIENT
Start: 2025-05-19 | End: 2025-05-19 | Stop reason: HOSPADM

## 2025-05-19 RX ORDER — DEXMEDETOMIDINE HYDROCHLORIDE 100 UG/ML
INJECTION, SOLUTION INTRAVENOUS AS NEEDED
Status: DISCONTINUED | OUTPATIENT
Start: 2025-05-19 | End: 2025-05-19 | Stop reason: SURG

## 2025-05-19 RX ORDER — BUPIVACAINE HCL/EPINEPHRINE 0.25-.0005
VIAL (ML) INJECTION AS NEEDED
Status: DISCONTINUED | OUTPATIENT
Start: 2025-05-19 | End: 2025-05-19 | Stop reason: HOSPADM

## 2025-05-19 RX ORDER — LIDOCAINE HYDROCHLORIDE 10 MG/ML
0.5 INJECTION, SOLUTION EPIDURAL; INFILTRATION; INTRACAUDAL; PERINEURAL ONCE AS NEEDED
Status: DISCONTINUED | OUTPATIENT
Start: 2025-05-19 | End: 2025-05-19 | Stop reason: HOSPADM

## 2025-05-19 RX ADMIN — CEFAZOLIN 2000 MG: 2 INJECTION, POWDER, FOR SOLUTION INTRAVENOUS at 08:55

## 2025-05-19 RX ADMIN — LIDOCAINE HYDROCHLORIDE 60 MG: 20 INJECTION, SOLUTION INFILTRATION; PERINEURAL at 08:50

## 2025-05-19 RX ADMIN — ROCURONIUM 5 MG: 50 INJECTION, SOLUTION INTRAVENOUS at 08:50

## 2025-05-19 RX ADMIN — FAMOTIDINE 20 MG: 10 INJECTION INTRAVENOUS at 07:41

## 2025-05-19 RX ADMIN — ROCURONIUM 25 MG: 50 INJECTION, SOLUTION INTRAVENOUS at 09:00

## 2025-05-19 RX ADMIN — PROPOFOL 130 MG: 10 INJECTION, EMULSION INTRAVENOUS at 08:50

## 2025-05-19 RX ADMIN — DEXAMETHASONE SODIUM PHOSPHATE 8 MG: 4 INJECTION INTRA-ARTICULAR; INTRALESIONAL; INTRAMUSCULAR; INTRAVENOUS; SOFT TISSUE at 07:41

## 2025-05-19 RX ADMIN — DEXMEDETOMIDINE 10 MCG: 200 INJECTION, SOLUTION INTRAVENOUS at 09:00

## 2025-05-19 RX ADMIN — SUCCINYLCHOLINE CHLORIDE 120 MG: 20 INJECTION, SOLUTION INTRAMUSCULAR; INTRAVENOUS at 08:50

## 2025-05-19 RX ADMIN — DEXMEDETOMIDINE 10 MCG: 200 INJECTION, SOLUTION INTRAVENOUS at 08:48

## 2025-05-19 RX ADMIN — ONDANSETRON 4 MG: 2 INJECTION INTRAMUSCULAR; INTRAVENOUS at 07:41

## 2025-05-19 RX ADMIN — SODIUM CHLORIDE, POTASSIUM CHLORIDE, SODIUM LACTATE AND CALCIUM CHLORIDE 9 ML/HR: 600; 310; 30; 20 INJECTION, SOLUTION INTRAVENOUS at 07:41

## 2025-05-19 RX ADMIN — SUGAMMADEX 200 MG: 100 INJECTION, SOLUTION INTRAVENOUS at 09:20

## 2025-05-19 RX ADMIN — KETAMINE HYDROCHLORIDE 10 MG: 10 INJECTION, SOLUTION INTRAMUSCULAR; INTRAVENOUS at 09:10

## 2025-05-19 RX ADMIN — FENTANYL CITRATE 50 MCG: 50 INJECTION INTRAMUSCULAR; INTRAVENOUS at 08:50

## 2025-05-19 RX ADMIN — MIDAZOLAM HYDROCHLORIDE 1 MG: 1 INJECTION, SOLUTION INTRAMUSCULAR; INTRAVENOUS at 08:18

## 2025-05-19 RX ADMIN — KETAMINE HYDROCHLORIDE 20 MG: 10 INJECTION, SOLUTION INTRAMUSCULAR; INTRAVENOUS at 08:50

## 2025-05-19 NOTE — H&P
[facility]   PREOPERATIVE HISTORY AND PHYSICAL    Patient Name:Shivani Hewitt  : 1990  MRN: [id number]  Primary Care Physician: Alycia Nicole PA  Date of admission: 2025    Subjective   Subjective     Chief Complaint: preoperative evaluation    History of Present Illness  Shivani Hewitt is a 35 y.o. female  who presents for preoperative evaluation. She is scheduled for Laparoscopic bilateral salpingectomy (Bilateral)    Patient Active Problem List   Diagnosis    LGSIL on Pap smear of cervix: , NEG     Hx laparoscopic cholecystectomy    Migraine with aura, with intractable migraine, so stated, with status migrainosus    Asthma    History of postpartum gestational hypertension    GBS bacteriuria- needs abx during labor    Request for sterilization    Immunization counseling       Review of Systems   Constitutional:  Negative for chills and fever.   Respiratory:  Negative for shortness of breath.    Cardiovascular:  Negative for chest pain.   Gastrointestinal:  Negative for abdominal pain, nausea and vomiting.   Genitourinary:  Negative for dysuria and pelvic pain.   Neurological:  Negative for headaches.        Personal History     Past Medical History:   Diagnosis Date    Asthma     Preeclampsia        Past Surgical History:   Procedure Laterality Date    CHOLECYSTECTOMY         Obstetric History:  OB History          3    Para   3    Term   3            AB        Living   3         SAB        IAB        Ectopic        Molar        Multiple   0    Live Births   3          Obstetric Comments    x 2- proven pelvis 8#2oz  Hx of GHTN post-delivery- received Mag              Menstrual History:     No LMP recorded (lmp unknown).       # 1 - Date: 13, Sex: Male, Weight: 3033 g (6 lb 11 oz), GA: 39w0d, Type: Vaginal, Spontaneous, Apgar1: None, Apgar5: None, Living: Living, Birth Comments: System Generated. Please review and update pregnancy details.    # 2 - Date:  08/13/14, Sex: Female, Weight: 3685 g (8 lb 2 oz), GA: None, Type: Vaginal, Spontaneous, Apgar1: None, Apgar5: None, Living: Living, Birth Comments: None    # 3 - Date: 03/23/25, Sex: Female, Weight: 4077 g (8 lb 15.8 oz), GA: 40w1d, Type: Vaginal, Spontaneous, Apgar1: 8, Apgar5: 9, Living: Living, Birth Comments: None      Family History: Her family history includes Diabetes in her mother; No Known Problems in her father.     Social History: She  reports that she has never smoked. She has never used smokeless tobacco. She reports that she does not drink alcohol and does not use drugs.    Home Medications:  AeroChamber MV, Fluticasone-Salmeterol, Loratadine, acetaminophen, albuterol sulfate HFA, docusate sodium, ibuprofen, naloxone, and prenatal vitamin    Allergies:  She is allergic to ketorolac tromethamine.    Objective    Objective     Vitals:    Temp:  [97.5 °F (36.4 °C)] 97.5 °F (36.4 °C)  Heart Rate:  [77] 77  Resp:  [18] 18  BP: (128)/(100) 128/100    Physical Exam  Vitals reviewed.   Constitutional:       General: She is not in acute distress.     Appearance: She is not ill-appearing.   Eyes:      Pupils: Pupils are equal, round, and reactive to light.   Pulmonary:      Effort: Pulmonary effort is normal. No respiratory distress.   Chest:      Comments: Deferred  Abdominal:      General: Abdomen is flat. There is no distension.      Palpations: Abdomen is soft.   Genitourinary:     Comments: Deferred  Musculoskeletal:         General: Normal range of motion.   Neurological:      General: No focal deficit present.      Mental Status: She is alert and oriented to person, place, and time. Mental status is at baseline.   Psychiatric:         Mood and Affect: Mood normal.         Behavior: Behavior normal.         Assessment & Plan   Assessment / Plan     Brief Patient Summary:  Shivani Hewitt is a 35 y.o. female who presents for preoperative evaluation.    Pre-Op Diagnosis Codes:      * Request for  "sterilization [Z30.2]    Active Hospital Problems:  Active Hospital Problems    Diagnosis     **Request for sterilization      Plan:   Procedure(s):  Laparoscopic bilateral salpingectomy    Risks, benefits and alternatives of the procedure were discussed, including , but not limited to: infection, bleeding, transfusion, injury to adjacent structures, finding of unexpected malignancy, reoperation, recurrent symptoms, laparotomy, possible non diagnostic findings, thromboembolic events, aneasthesic complications and death. Pre/intra/postop course was reviewed and all questions answered. Patient was encouraged to call for any additional questions she might have in the future. We also discussed the different methods of sterilization including cautery, partial salpingectomy, bilateral salpingectomy and she desires bilateral salpingectomy.  Bilateral salpingectomy may decrease one's lifetime risk of ovarian cancer, however, it is not \"reversible\" and if a future pregnancy is required one would need IVF.     Bryson Norris MD  5/19/2025   08:18 EDT      "

## 2025-05-19 NOTE — OP NOTE
Subjective     Date of Service:  05/19/25  Time of Service:  09:35 EDT    Surgical Staff: Surgeons and Role:     * Bryson Norris MD - Primary   Additional Staff: Assistant: Quentin Corrigan CSA was responsible for performing the following activities: Retraction, Suturing, Closing, and removal of fallopian tubes  and their skilled assistance was necessary for the success of this case.   Pre-operative diagnosis(es): Pre-Op Diagnosis Codes:      * Request for sterilization [Z30.2]     Post-operative diagnosis(es): Post-Op Diagnosis Codes:     * Request for sterilization [Z30.2]   Procedure(s): Procedure(s):  Laparoscopic bilateral salpingectomy     Antibiotics: cefazolin (Ancef)ordered on call to OR     Anesthesia: Type: Choice  ASA:  II     Objective      Operative findings: Retroverted uterus, normal appearing fallopian tubes and ovaries bilaterally. No intraabdominal adhesions   Specimens removed: ID Type Source Tests Collected by Time   A : left fallopian tube Tissue Fallopian Tube, Left TISSUE PATHOLOGY EXAM Bryson Norris MD 5/19/2025 0912   B : right fallopian tube Tissue Fallopian Tube, Right TISSUE PATHOLOGY EXAM Bryson Norris MD 5/19/2025 0912      Output: Est. Blood Loss <10mL    I/O this shift:  In: 500 [I.V.:400; IV Piggyback:100]  Out: -      Blood products used: No   Drains: * No LDAs found *   Implant Information: Nothing was implanted during the procedure   Complications: None apparent   Intraoperative consult(s): none   Condition: stable   Disposition: to PACU and then discharge home         Assessment & Plan     The patient was taken to the operating room with intravenous fluids running and sequential compression devices in place.  General anesthesia was induced without difficulty. An examination under anesthesia revealed findings as noted above. She was then prepped and draped in the normal sterile fashion in the dorsal lithotomy position.  Her bladder was straight catheterized. A surgical time out  was performed using two patient identifiers.     Attention was then turned to the abdomen. Dilute Marcaine was used to infiltrate the infraumbilical space and a 5 mm stab incision was made with a scalpel.  A 5mm Optiview trocar was advanced through the incision to obtain direct entry into the abdomen. Insufflation was attached and the abdomen was insufflated with carbon dioxide gas to 15 mmHg.  Initial survey revealed no injury from the placement of the trocar.  The patient was then put in Trendelenburg. An additional 5 millimeter port was placed in the right and 8 millimeter port in the left lower quadrants under direct visualization without difficulty and a full survey of the pelvis was completed with findings noted above.        The right fallopian tube was grasped at the fimbriated end and the Enseal electrosurgical device was used to coagulate and transect the mesosalpinx.  At the cornua the fallopian tube was transected.  The specimen was removed through the port without difficulty and the mesosalpinx was hemostastic upon inspection.  In a similar manner, the left fallopian tube was transected, excised, and removed from the abdomen. Final inspection showed all surgical areas near the uterus, ovaries, and adnexa were hemostatic. The left and right lower quadrant ports were removed under direct visualization with good hemostasis noted. The abdomen was desufflated and the infraumbilical trocar was removed without difficulty. The port sites were closed with a subcuticular stitch of 4-0 monocryl and covered with Dermabond surgical glue.     The patient was awakened from general anesthesia without difficulty and taken to the recovery room in stable condition. All counts were correct and no complications were noted.     Bryson Norris MD  5/19/2025   09:38 EDT

## 2025-05-19 NOTE — ANESTHESIA PROCEDURE NOTES
Airway  Reason: elective    Date/Time: 5/19/2025 8:52 AM  Airway not difficult    General Information and Staff    Patient location during procedure: OR  CRNA/CAA: Susana Corona CRNA    Indications and Patient Condition  Indications for airway management: airway protection    Preoxygenated: yes    Mask difficulty assessment: 1 - vent by mask    Final Airway Details    Final airway type: endotracheal airway      Successful airway: ETT  Cuffed: yes   Successful intubation technique: direct laryngoscopy  Adjuncts used in placement: intubating stylet  Endotracheal tube insertion site: oral  Blade: Ott  Blade size: 2  ETT size (mm): 7.0  Cormack-Lehane Classification: grade I - full view of glottis  Placement verified by: chest auscultation and capnometry   Measured from: lips  ETT/EBT  to lips (cm): 21  Number of attempts at approach: 1  Assessment: lips, teeth, and gum same as pre-op and atraumatic intubation           Informed patient of negative std and pap smear results  Patient expresses understanding

## 2025-05-19 NOTE — ANESTHESIA PREPROCEDURE EVALUATION
Anesthesia Evaluation     Patient summary reviewed and Nursing notes reviewed   no history of anesthetic complications:   NPO Solid Status: > 8 hours  NPO Liquid Status: > 8 hours           Airway   Mallampati: II  TM distance: >3 FB  Neck ROM: full  No difficulty expected  Dental - normal exam     Pulmonary - normal exam   (+) asthma,  Cardiovascular - normal exam    Rhythm: regular  Rate: normal    (-) hypertension (no meds)      Neuro/Psych  (+) headaches  GI/Hepatic/Renal/Endo    (-) GERD    Musculoskeletal     Abdominal  - normal exam   Substance History - negative use  (-) alcohol use, drug use     OB/GYN    (-)  Pregnant, Preeclampsia and history of pregnancy induced hypertension        Other - negative ROS                       Anesthesia Plan    ASA 2     general     intravenous induction     Anesthetic plan, risks, benefits, and alternatives have been provided, discussed and informed consent has been obtained with: patient and spouse/significant other.  Pre-procedure education provided  Use of blood products discussed with  Consented to blood products.    Plan discussed with CRNA.        CODE STATUS:    Code Status (Patient has no pulse and is not breathing): CPR (Attempt to Resuscitate)  Medical Interventions (Patient has pulse or is breathing): Full Support

## 2025-05-19 NOTE — ANESTHESIA POSTPROCEDURE EVALUATION
Patient: Shivani Hewitt    Procedure Summary       Date: 05/19/25 Room / Location: [facility] OR 3 /  [facility] OR    Anesthesia Start: 0845 Anesthesia Stop: 0945    Procedure: Laparoscopic bilateral salpingectomy (Bilateral: Abdomen) Diagnosis:       Request for sterilization      (Request for sterilization [Z30.2])    Surgeons: Bryson Norris MD Provider: Susana Corona CRNA    Anesthesia Type: general ASA Status: 2            Anesthesia Type: general    Vitals  Vitals Value Taken Time   /79 05/19/25 10:35   Temp 97.8 °F (36.6 °C) 05/19/25 09:43   Pulse 82 05/19/25 10:36   Resp 14 05/19/25 10:30   SpO2 90 % 05/19/25 10:36   Vitals shown include unfiled device data.        Post Anesthesia Care and Evaluation    Patient location during evaluation: bedside  Patient participation: complete - patient participated  Level of consciousness: awake and alert  Pain score: 0  Pain management: adequate    Airway patency: patent  Anesthetic complications: No anesthetic complications  PONV Status: none  Cardiovascular status: acceptable  Respiratory status: acceptable  Hydration status: acceptable

## 2025-05-20 LAB
CYTO UR: NORMAL
LAB AP CASE REPORT: NORMAL
PATH REPORT.FINAL DX SPEC: NORMAL
PATH REPORT.GROSS SPEC: NORMAL

## 2025-08-08 ENCOUNTER — TRANSCRIBE ORDERS (OUTPATIENT)
Dept: ADMINISTRATIVE | Facility: HOSPITAL | Age: 35
End: 2025-08-08
Payer: COMMERCIAL

## 2025-08-08 DIAGNOSIS — R74.8 ELEVATED LIVER ENZYMES: Primary | ICD-10-CM

## 2025-08-21 ENCOUNTER — HOSPITAL ENCOUNTER (OUTPATIENT)
Dept: ULTRASOUND IMAGING | Facility: HOSPITAL | Age: 35
Discharge: HOME | End: 2025-08-21
Admitting: PHYSICIAN ASSISTANT
Payer: MEDICAID

## 2025-08-21 DIAGNOSIS — R74.8 ELEVATED LIVER ENZYMES: ICD-10-CM

## 2025-08-21 PROCEDURE — 76705 ECHO EXAM OF ABDOMEN: CPT

## (undated) DEVICE — LAPAROVUE VISIBILITY SYSTEM LAPAROSCOPIC SOLUTIONS: Brand: LAPAROVUE

## (undated) DEVICE — SYR LL TP 10ML STRL

## (undated) DEVICE — LAG GYN LAPAROSCOPY: Brand: MEDLINE INDUSTRIES, INC.

## (undated) DEVICE — SUT MNCRYL 4/0 PS2 18 IN

## (undated) DEVICE — ENDOPATH XCEL BLADELESS TROCARS WITH STABILITY SLEEVES: Brand: ENDOPATH XCEL

## (undated) DEVICE — PAD,NON-ADHERENT,3X8,STERILE,LF,1/PK: Brand: MEDLINE

## (undated) DEVICE — DRSNG SURESITE WNDW 2.38X2.75

## (undated) DEVICE — NDL HYPO PRECISIONGLIDE REG 25G 1 1/2

## (undated) DEVICE — GLV SURG SENSICARE PI LF PF 7.5 GRN STRL

## (undated) DEVICE — MICRO HVTSA, 0.5G AND HVTSA SOURCEMARK PRODUCT CODE M1206 AND M1206-01: Brand: EXOFIN MICRO HVTSA, 0.5G

## (undated) DEVICE — ENDOPATH XCEL UNIVERSAL TROCAR STABLILITY SLEEVES: Brand: ENDOPATH XCEL

## (undated) DEVICE — CONTAINER,SPECIMEN,OR STERILE,4OZ: Brand: MEDLINE

## (undated) DEVICE — DRSNG SURESITE WNDW 4X4.5

## (undated) DEVICE — LAPAROSCOPIC SMOKE FILTRATION SYSTEM: Brand: PALL LAPAROSHIELD® PLUS LAPAROSCOPIC SMOKE FILTRATION SYSTEM

## (undated) DEVICE — TBG INSUFL W FLTR STRL

## (undated) DEVICE — DRAPE,UNDERBUTTOCKS,STERILE: Brand: MEDLINE

## (undated) DEVICE — SOL IRR H2O BO 1000ML STRL